# Patient Record
Sex: FEMALE | Race: WHITE | NOT HISPANIC OR LATINO | Employment: OTHER | URBAN - METROPOLITAN AREA
[De-identification: names, ages, dates, MRNs, and addresses within clinical notes are randomized per-mention and may not be internally consistent; named-entity substitution may affect disease eponyms.]

---

## 2017-01-18 ENCOUNTER — OFFICE VISIT - HEALTHEAST (OUTPATIENT)
Dept: FAMILY MEDICINE | Facility: CLINIC | Age: 52
End: 2017-01-18

## 2017-01-18 DIAGNOSIS — Z20.818 EXPOSURE TO STREP THROAT: ICD-10-CM

## 2017-01-19 ENCOUNTER — COMMUNICATION - HEALTHEAST (OUTPATIENT)
Dept: FAMILY MEDICINE | Facility: CLINIC | Age: 52
End: 2017-01-19

## 2017-02-08 ENCOUNTER — COMMUNICATION - HEALTHEAST (OUTPATIENT)
Dept: FAMILY MEDICINE | Facility: CLINIC | Age: 52
End: 2017-02-08

## 2017-02-08 DIAGNOSIS — E03.9 UNSPECIFIED HYPOTHYROIDISM: ICD-10-CM

## 2017-04-17 ENCOUNTER — COMMUNICATION - HEALTHEAST (OUTPATIENT)
Dept: FAMILY MEDICINE | Facility: CLINIC | Age: 52
End: 2017-04-17

## 2017-04-17 DIAGNOSIS — E03.9 UNSPECIFIED HYPOTHYROIDISM: ICD-10-CM

## 2017-05-15 ENCOUNTER — OFFICE VISIT - HEALTHEAST (OUTPATIENT)
Dept: FAMILY MEDICINE | Facility: CLINIC | Age: 52
End: 2017-05-15

## 2017-05-15 DIAGNOSIS — C18.9 MALIGNANT NEOPLASM OF COLON (H): ICD-10-CM

## 2017-05-15 DIAGNOSIS — R73.09 OTHER ABNORMAL GLUCOSE: ICD-10-CM

## 2017-05-15 DIAGNOSIS — E03.9 HYPOTHYROIDISM: ICD-10-CM

## 2017-05-15 DIAGNOSIS — E03.9 UNSPECIFIED HYPOTHYROIDISM: ICD-10-CM

## 2017-05-15 LAB
CEA SERPL-MCNC: 0.7 NG/ML (ref 0–3)
CHOLEST SERPL-MCNC: 241 MG/DL
FASTING STATUS PATIENT QL REPORTED: YES
HBA1C MFR BLD: 5.7 % (ref 3.5–6)
HDLC SERPL-MCNC: 48 MG/DL
LDLC SERPL CALC-MCNC: 174 MG/DL
TRIGL SERPL-MCNC: 95 MG/DL

## 2017-05-15 ASSESSMENT — MIFFLIN-ST. JEOR: SCORE: 1828.74

## 2017-05-16 ENCOUNTER — COMMUNICATION - HEALTHEAST (OUTPATIENT)
Dept: FAMILY MEDICINE | Facility: CLINIC | Age: 52
End: 2017-05-16

## 2017-05-17 ENCOUNTER — AMBULATORY - HEALTHEAST (OUTPATIENT)
Dept: FAMILY MEDICINE | Facility: CLINIC | Age: 52
End: 2017-05-17

## 2017-07-23 ENCOUNTER — COMMUNICATION - HEALTHEAST (OUTPATIENT)
Dept: FAMILY MEDICINE | Facility: CLINIC | Age: 52
End: 2017-07-23

## 2017-07-25 ENCOUNTER — AMBULATORY - HEALTHEAST (OUTPATIENT)
Dept: FAMILY MEDICINE | Facility: CLINIC | Age: 52
End: 2017-07-25

## 2017-07-25 DIAGNOSIS — E03.9 HYPOTHYROID: ICD-10-CM

## 2017-08-25 ENCOUNTER — HOSPITAL ENCOUNTER (OUTPATIENT)
Dept: MAMMOGRAPHY | Facility: CLINIC | Age: 52
Discharge: HOME | End: 2017-08-25
Attending: FAMILY MEDICINE

## 2017-08-25 DIAGNOSIS — Z12.31 VISIT FOR SCREENING MAMMOGRAM: ICD-10-CM

## 2017-09-22 ENCOUNTER — COMMUNICATION - HEALTHEAST (OUTPATIENT)
Dept: FAMILY MEDICINE | Facility: CLINIC | Age: 52
End: 2017-09-22

## 2017-09-22 ENCOUNTER — OFFICE VISIT - HEALTHEAST (OUTPATIENT)
Dept: FAMILY MEDICINE | Facility: CLINIC | Age: 52
End: 2017-09-22

## 2017-09-22 ENCOUNTER — COMMUNICATION - HEALTHEAST (OUTPATIENT)
Dept: SCHEDULING | Facility: CLINIC | Age: 52
End: 2017-09-22

## 2017-09-22 DIAGNOSIS — T63.441A BEE STING: ICD-10-CM

## 2017-12-20 ENCOUNTER — COMMUNICATION - HEALTHEAST (OUTPATIENT)
Dept: FAMILY MEDICINE | Facility: CLINIC | Age: 52
End: 2017-12-20

## 2017-12-20 DIAGNOSIS — E03.9 HYPOTHYROIDISM: ICD-10-CM

## 2018-01-15 ENCOUNTER — OFFICE VISIT - HEALTHEAST (OUTPATIENT)
Dept: FAMILY MEDICINE | Facility: CLINIC | Age: 53
End: 2018-01-15

## 2018-01-15 DIAGNOSIS — E78.5 HYPERLIPIDEMIA: ICD-10-CM

## 2018-01-15 DIAGNOSIS — Z23 NEED FOR INFLUENZA VACCINATION: ICD-10-CM

## 2018-01-15 DIAGNOSIS — C18.9 MALIGNANT NEOPLASM OF COLON (H): ICD-10-CM

## 2018-01-15 DIAGNOSIS — E03.9 HYPOTHYROIDISM: ICD-10-CM

## 2018-01-15 DIAGNOSIS — E55.9 VITAMIN D DEFICIENCY: ICD-10-CM

## 2018-01-15 DIAGNOSIS — Z00.00 ROUTINE GENERAL MEDICAL EXAMINATION AT A HEALTH CARE FACILITY: ICD-10-CM

## 2018-01-15 DIAGNOSIS — Z23 NEED FOR HEPATITIS A IMMUNIZATION: ICD-10-CM

## 2018-01-15 DIAGNOSIS — R73.09 OTHER ABNORMAL GLUCOSE: ICD-10-CM

## 2018-01-15 LAB
ALBUMIN SERPL-MCNC: 4.1 G/DL (ref 3.5–5)
ALP SERPL-CCNC: 68 U/L (ref 45–120)
ALT SERPL W P-5'-P-CCNC: 33 U/L (ref 0–45)
ANION GAP SERPL CALCULATED.3IONS-SCNC: 9 MMOL/L (ref 5–18)
AST SERPL W P-5'-P-CCNC: 23 U/L (ref 0–40)
BILIRUB SERPL-MCNC: 0.8 MG/DL (ref 0–1)
BUN SERPL-MCNC: 14 MG/DL (ref 8–22)
CALCIUM SERPL-MCNC: 10 MG/DL (ref 8.5–10.5)
CEA SERPL-MCNC: 1 NG/ML (ref 0–3)
CHLORIDE BLD-SCNC: 104 MMOL/L (ref 98–107)
CHOLEST SERPL-MCNC: 284 MG/DL
CO2 SERPL-SCNC: 29 MMOL/L (ref 22–31)
CREAT SERPL-MCNC: 0.78 MG/DL (ref 0.6–1.1)
ERYTHROCYTE [DISTWIDTH] IN BLOOD BY AUTOMATED COUNT: 12.2 % (ref 11–14.5)
FASTING STATUS PATIENT QL REPORTED: ABNORMAL
GFR SERPL CREATININE-BSD FRML MDRD: >60 ML/MIN/1.73M2
GLUCOSE BLD-MCNC: 111 MG/DL (ref 70–125)
HBA1C MFR BLD: 5.7 % (ref 3.5–6)
HCT VFR BLD AUTO: 43.8 % (ref 35–47)
HDLC SERPL-MCNC: 61 MG/DL
HGB BLD-MCNC: 14.8 G/DL (ref 12–16)
LDLC SERPL CALC-MCNC: 202 MG/DL
MCH RBC QN AUTO: 30.2 PG (ref 27–34)
MCHC RBC AUTO-ENTMCNC: 33.8 G/DL (ref 32–36)
MCV RBC AUTO: 89 FL (ref 80–100)
PLATELET # BLD AUTO: 229 THOU/UL (ref 140–440)
PMV BLD AUTO: 8.5 FL (ref 7–10)
POTASSIUM BLD-SCNC: 5 MMOL/L (ref 3.5–5)
PROT SERPL-MCNC: 7.6 G/DL (ref 6–8)
RBC # BLD AUTO: 4.89 MILL/UL (ref 3.8–5.4)
SODIUM SERPL-SCNC: 142 MMOL/L (ref 136–145)
TRIGL SERPL-MCNC: 105 MG/DL
TSH SERPL DL<=0.005 MIU/L-ACNC: 1.3 UIU/ML (ref 0.3–5)
WBC: 7.2 THOU/UL (ref 4–11)

## 2018-01-15 RX ORDER — MAGNESIUM 30 MG
30 TABLET ORAL 2 TIMES DAILY
Status: SHIPPED | COMMUNITY
Start: 2018-01-15 | End: 2023-01-09

## 2018-01-15 ASSESSMENT — MIFFLIN-ST. JEOR: SCORE: 1810.59

## 2018-01-16 ENCOUNTER — COMMUNICATION - HEALTHEAST (OUTPATIENT)
Dept: FAMILY MEDICINE | Facility: CLINIC | Age: 53
End: 2018-01-16

## 2018-01-16 LAB
25(OH)D3 SERPL-MCNC: 54.6 NG/ML (ref 30–80)
25(OH)D3 SERPL-MCNC: 54.6 NG/ML (ref 30–80)

## 2018-01-22 ENCOUNTER — AMBULATORY - HEALTHEAST (OUTPATIENT)
Dept: FAMILY MEDICINE | Facility: CLINIC | Age: 53
End: 2018-01-22

## 2018-02-03 ENCOUNTER — COMMUNICATION - HEALTHEAST (OUTPATIENT)
Dept: FAMILY MEDICINE | Facility: CLINIC | Age: 53
End: 2018-02-03

## 2018-02-25 ENCOUNTER — COMMUNICATION - HEALTHEAST (OUTPATIENT)
Dept: FAMILY MEDICINE | Facility: CLINIC | Age: 53
End: 2018-02-25

## 2018-03-07 ENCOUNTER — RECORDS - HEALTHEAST (OUTPATIENT)
Dept: ADMINISTRATIVE | Facility: OTHER | Age: 53
End: 2018-03-07

## 2018-06-24 ENCOUNTER — COMMUNICATION - HEALTHEAST (OUTPATIENT)
Dept: FAMILY MEDICINE | Facility: CLINIC | Age: 53
End: 2018-06-24

## 2018-06-24 DIAGNOSIS — E03.9 HYPOTHYROIDISM: ICD-10-CM

## 2018-07-15 ENCOUNTER — COMMUNICATION - HEALTHEAST (OUTPATIENT)
Dept: FAMILY MEDICINE | Facility: CLINIC | Age: 53
End: 2018-07-15

## 2018-07-16 ENCOUNTER — AMBULATORY - HEALTHEAST (OUTPATIENT)
Dept: FAMILY MEDICINE | Facility: CLINIC | Age: 53
End: 2018-07-16

## 2018-07-18 ENCOUNTER — OFFICE VISIT - HEALTHEAST (OUTPATIENT)
Dept: FAMILY MEDICINE | Facility: CLINIC | Age: 53
End: 2018-07-18
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
## 2018-07-18 ASSESSMENT — MIFFLIN-ST. JEOR: SCORE: 1801.52

## 2018-12-09 ENCOUNTER — COMMUNICATION - HEALTHEAST (OUTPATIENT)
Dept: FAMILY MEDICINE | Facility: CLINIC | Age: 53
End: 2018-12-09

## 2018-12-09 DIAGNOSIS — E03.9 HYPOTHYROIDISM: ICD-10-CM

## 2019-02-26 ENCOUNTER — COMMUNICATION - HEALTHEAST (OUTPATIENT)
Dept: FAMILY MEDICINE | Facility: CLINIC | Age: 54
End: 2019-02-26

## 2019-02-26 DIAGNOSIS — E03.9 HYPOTHYROIDISM: ICD-10-CM

## 2019-04-23 ENCOUNTER — HOSPITAL ENCOUNTER (OUTPATIENT)
Dept: MAMMOGRAPHY | Facility: CLINIC | Age: 54
Discharge: HOME | End: 2019-04-23

## 2019-04-23 DIAGNOSIS — Z12.31 VISIT FOR SCREENING MAMMOGRAM: ICD-10-CM

## 2019-05-16 ENCOUNTER — COMMUNICATION - HEALTHEAST (OUTPATIENT)
Dept: FAMILY MEDICINE | Facility: CLINIC | Age: 54
End: 2019-05-16

## 2019-05-16 DIAGNOSIS — E03.9 HYPOTHYROIDISM: ICD-10-CM

## 2019-05-28 ENCOUNTER — COMMUNICATION - HEALTHEAST (OUTPATIENT)
Dept: LAB | Facility: CLINIC | Age: 54
End: 2019-05-28

## 2019-05-28 DIAGNOSIS — R73.09 OTHER ABNORMAL GLUCOSE: ICD-10-CM

## 2019-05-28 DIAGNOSIS — E78.5 HYPERLIPIDEMIA, UNSPECIFIED HYPERLIPIDEMIA TYPE: ICD-10-CM

## 2019-05-28 DIAGNOSIS — C18.9 MALIGNANT NEOPLASM OF COLON, UNSPECIFIED PART OF COLON (H): ICD-10-CM

## 2019-05-28 DIAGNOSIS — E03.9 HYPOTHYROIDISM, UNSPECIFIED TYPE: ICD-10-CM

## 2019-05-29 ENCOUNTER — AMBULATORY - HEALTHEAST (OUTPATIENT)
Dept: LAB | Facility: CLINIC | Age: 54
End: 2019-05-29

## 2019-05-29 DIAGNOSIS — E03.9 HYPOTHYROIDISM, UNSPECIFIED TYPE: ICD-10-CM

## 2019-05-29 DIAGNOSIS — C18.9 MALIGNANT NEOPLASM OF COLON, UNSPECIFIED PART OF COLON (H): ICD-10-CM

## 2019-05-29 DIAGNOSIS — E78.5 HYPERLIPIDEMIA, UNSPECIFIED HYPERLIPIDEMIA TYPE: ICD-10-CM

## 2019-05-29 LAB
ALBUMIN SERPL-MCNC: 3.9 G/DL (ref 3.5–5)
ALP SERPL-CCNC: 62 U/L (ref 45–120)
ALT SERPL W P-5'-P-CCNC: 23 U/L (ref 0–45)
ANION GAP SERPL CALCULATED.3IONS-SCNC: 9 MMOL/L (ref 5–18)
AST SERPL W P-5'-P-CCNC: 19 U/L (ref 0–40)
BILIRUB SERPL-MCNC: 0.6 MG/DL (ref 0–1)
BUN SERPL-MCNC: 14 MG/DL (ref 8–22)
CALCIUM SERPL-MCNC: 9.9 MG/DL (ref 8.5–10.5)
CEA SERPL-MCNC: 0.7 NG/ML (ref 0–3)
CHLORIDE BLD-SCNC: 106 MMOL/L (ref 98–107)
CHOLEST SERPL-MCNC: 246 MG/DL
CO2 SERPL-SCNC: 28 MMOL/L (ref 22–31)
CREAT SERPL-MCNC: 0.83 MG/DL (ref 0.6–1.1)
FASTING STATUS PATIENT QL REPORTED: YES
GFR SERPL CREATININE-BSD FRML MDRD: >60 ML/MIN/1.73M2
GLUCOSE BLD-MCNC: 101 MG/DL (ref 70–125)
HDLC SERPL-MCNC: 52 MG/DL
LDLC SERPL CALC-MCNC: 170 MG/DL
POTASSIUM BLD-SCNC: 4.3 MMOL/L (ref 3.5–5)
PROT SERPL-MCNC: 6.8 G/DL (ref 6–8)
SODIUM SERPL-SCNC: 143 MMOL/L (ref 136–145)
T4 FREE SERPL-MCNC: 1.1 NG/DL (ref 0.7–1.8)
TRIGL SERPL-MCNC: 118 MG/DL
TSH SERPL DL<=0.005 MIU/L-ACNC: 0.32 UIU/ML (ref 0.3–5)

## 2019-05-30 ENCOUNTER — COMMUNICATION - HEALTHEAST (OUTPATIENT)
Dept: FAMILY MEDICINE | Facility: CLINIC | Age: 54
End: 2019-05-30

## 2019-06-14 ENCOUNTER — COMMUNICATION - HEALTHEAST (OUTPATIENT)
Dept: FAMILY MEDICINE | Facility: CLINIC | Age: 54
End: 2019-06-14

## 2019-06-14 DIAGNOSIS — E03.9 HYPOTHYROIDISM: ICD-10-CM

## 2019-07-08 ENCOUNTER — OFFICE VISIT - HEALTHEAST (OUTPATIENT)
Dept: FAMILY MEDICINE | Facility: CLINIC | Age: 54
End: 2019-07-08

## 2019-07-08 DIAGNOSIS — Z00.00 ROUTINE ADULT HEALTH MAINTENANCE: ICD-10-CM

## 2019-07-08 DIAGNOSIS — E03.9 HYPOTHYROIDISM: ICD-10-CM

## 2019-07-08 DIAGNOSIS — E78.5 HYPERLIPIDEMIA, UNSPECIFIED HYPERLIPIDEMIA TYPE: ICD-10-CM

## 2019-07-08 DIAGNOSIS — E66.01 MORBID OBESITY (H): ICD-10-CM

## 2019-07-08 DIAGNOSIS — C18.7 MALIGNANT NEOPLASM OF SIGMOID COLON (H): ICD-10-CM

## 2019-10-20 ENCOUNTER — COMMUNICATION - HEALTHEAST (OUTPATIENT)
Dept: FAMILY MEDICINE | Facility: CLINIC | Age: 54
End: 2019-10-20

## 2019-10-21 ENCOUNTER — AMBULATORY - HEALTHEAST (OUTPATIENT)
Dept: FAMILY MEDICINE | Facility: CLINIC | Age: 54
End: 2019-10-21
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
## 2019-11-19 ENCOUNTER — AMBULATORY - HEALTHEAST (OUTPATIENT)
Dept: NURSING | Facility: CLINIC | Age: 54
End: 2019-11-19

## 2020-02-05 ENCOUNTER — COMMUNICATION - HEALTHEAST (OUTPATIENT)
Dept: FAMILY MEDICINE | Facility: CLINIC | Age: 55
End: 2020-02-05

## 2020-02-05 DIAGNOSIS — E03.9 HYPOTHYROIDISM: ICD-10-CM

## 2020-02-19 ENCOUNTER — OFFICE VISIT - HEALTHEAST (OUTPATIENT)
Dept: FAMILY MEDICINE | Facility: CLINIC | Age: 55
End: 2020-02-19

## 2020-02-19 DIAGNOSIS — R30.0 DYSURIA: ICD-10-CM

## 2020-02-19 LAB
ALBUMIN UR-MCNC: ABNORMAL MG/DL
APPEARANCE UR: ABNORMAL
BILIRUB UR QL STRIP: NEGATIVE
COLOR UR AUTO: YELLOW
GLUCOSE UR STRIP-MCNC: NEGATIVE MG/DL
HGB UR QL STRIP: ABNORMAL
KETONES UR STRIP-MCNC: NEGATIVE MG/DL
LEUKOCYTE ESTERASE UR QL STRIP: ABNORMAL
NITRATE UR QL: NEGATIVE
PH UR STRIP: 7.5 [PH] (ref 5–8)
SP GR UR STRIP: 1.02 (ref 1–1.03)
UROBILINOGEN UR STRIP-ACNC: ABNORMAL

## 2020-02-21 LAB — BACTERIA SPEC CULT: ABNORMAL

## 2020-04-17 ENCOUNTER — COMMUNICATION - HEALTHEAST (OUTPATIENT)
Dept: ADMINISTRATIVE | Facility: CLINIC | Age: 55
End: 2020-04-17

## 2020-04-17 DIAGNOSIS — E03.9 HYPOTHYROIDISM: ICD-10-CM

## 2020-07-19 ENCOUNTER — COMMUNICATION - HEALTHEAST (OUTPATIENT)
Dept: FAMILY MEDICINE | Facility: CLINIC | Age: 55
End: 2020-07-19

## 2020-07-19 DIAGNOSIS — E03.9 HYPOTHYROIDISM: ICD-10-CM

## 2020-07-30 ENCOUNTER — COMMUNICATION - HEALTHEAST (OUTPATIENT)
Dept: FAMILY MEDICINE | Facility: CLINIC | Age: 55
End: 2020-07-30

## 2020-08-13 ENCOUNTER — OFFICE VISIT - HEALTHEAST (OUTPATIENT)
Dept: FAMILY MEDICINE | Facility: CLINIC | Age: 55
End: 2020-08-13

## 2020-08-13 DIAGNOSIS — C18.9 MALIGNANT NEOPLASM OF COLON, UNSPECIFIED PART OF COLON (H): ICD-10-CM

## 2020-08-13 DIAGNOSIS — R05.9 COUGH: ICD-10-CM

## 2020-08-13 DIAGNOSIS — E03.9 HYPOTHYROIDISM, UNSPECIFIED TYPE: ICD-10-CM

## 2020-08-13 DIAGNOSIS — E78.2 MIXED HYPERLIPIDEMIA: ICD-10-CM

## 2020-08-13 DIAGNOSIS — R73.09 OTHER ABNORMAL GLUCOSE: ICD-10-CM

## 2020-08-13 LAB — HBA1C MFR BLD: 5.4 %

## 2020-08-14 LAB
ALBUMIN SERPL-MCNC: 4.5 G/DL (ref 3.5–5)
ALP SERPL-CCNC: 68 U/L (ref 45–120)
ALT SERPL W P-5'-P-CCNC: 25 U/L (ref 0–45)
ANION GAP SERPL CALCULATED.3IONS-SCNC: 12 MMOL/L (ref 5–18)
AST SERPL W P-5'-P-CCNC: 20 U/L (ref 0–40)
BILIRUB SERPL-MCNC: 0.5 MG/DL (ref 0–1)
BUN SERPL-MCNC: 12 MG/DL (ref 8–22)
CALCIUM SERPL-MCNC: 10.3 MG/DL (ref 8.5–10.5)
CHLORIDE BLD-SCNC: 103 MMOL/L (ref 98–107)
CHOLEST SERPL-MCNC: 283 MG/DL
CO2 SERPL-SCNC: 27 MMOL/L (ref 22–31)
CREAT SERPL-MCNC: 0.78 MG/DL (ref 0.6–1.1)
FASTING STATUS PATIENT QL REPORTED: NO
GFR SERPL CREATININE-BSD FRML MDRD: >60 ML/MIN/1.73M2
GLUCOSE BLD-MCNC: 96 MG/DL (ref 70–125)
HDLC SERPL-MCNC: 62 MG/DL
LDLC SERPL CALC-MCNC: 178 MG/DL
POTASSIUM BLD-SCNC: 4.2 MMOL/L (ref 3.5–5)
PROT SERPL-MCNC: 7.5 G/DL (ref 6–8)
SODIUM SERPL-SCNC: 142 MMOL/L (ref 136–145)
T4 FREE SERPL-MCNC: 1 NG/DL (ref 0.7–1.8)
TRIGL SERPL-MCNC: 214 MG/DL
TSH SERPL DL<=0.005 MIU/L-ACNC: 0.91 UIU/ML (ref 0.3–5)

## 2020-08-17 LAB
CEA SERPL-MCNC: 0.9 NG/ML (ref 0–3)
COVID-19 ANTIBODY IGG: NEGATIVE

## 2020-08-18 ENCOUNTER — COMMUNICATION - HEALTHEAST (OUTPATIENT)
Dept: SCHEDULING | Facility: CLINIC | Age: 55
End: 2020-08-18

## 2020-08-18 DIAGNOSIS — E03.9 HYPOTHYROIDISM: ICD-10-CM

## 2021-03-11 ENCOUNTER — COMMUNICATION - HEALTHEAST (OUTPATIENT)
Dept: FAMILY MEDICINE | Facility: CLINIC | Age: 56
End: 2021-03-11

## 2021-03-25 ENCOUNTER — AMBULATORY - HEALTHEAST (OUTPATIENT)
Dept: NURSING | Facility: CLINIC | Age: 56
End: 2021-03-25

## 2021-03-31 ENCOUNTER — OFFICE VISIT - HEALTHEAST (OUTPATIENT)
Dept: FAMILY MEDICINE | Facility: CLINIC | Age: 56
End: 2021-03-31

## 2021-03-31 DIAGNOSIS — R30.0 DYSURIA: ICD-10-CM

## 2021-03-31 LAB
ALBUMIN UR-MCNC: NEGATIVE G/DL
APPEARANCE UR: ABNORMAL
BILIRUB UR QL STRIP: NEGATIVE
COLOR UR AUTO: YELLOW
GLUCOSE UR STRIP-MCNC: NEGATIVE MG/DL
HGB UR QL STRIP: ABNORMAL
KETONES UR STRIP-MCNC: NEGATIVE MG/DL
LEUKOCYTE ESTERASE UR QL STRIP: ABNORMAL
NITRATE UR QL: NEGATIVE
PH UR STRIP: 7 [PH] (ref 5–8)
SP GR UR STRIP: 1.01 (ref 1–1.03)
UROBILINOGEN UR STRIP-ACNC: ABNORMAL

## 2021-03-31 ASSESSMENT — MIFFLIN-ST. JEOR: SCORE: 1715.34

## 2021-04-02 LAB — BACTERIA SPEC CULT: ABNORMAL

## 2021-04-15 ENCOUNTER — AMBULATORY - HEALTHEAST (OUTPATIENT)
Dept: NURSING | Facility: CLINIC | Age: 56
End: 2021-04-15

## 2021-05-26 ENCOUNTER — RECORDS - HEALTHEAST (OUTPATIENT)
Dept: ADMINISTRATIVE | Facility: CLINIC | Age: 56
End: 2021-05-26

## 2021-05-28 NOTE — TELEPHONE ENCOUNTER
RN cannot approve Refill Request    RN can NOT refill this medication overdue for office visits and/or labs.    Sb New, [facility] Triage/Med Refill 5/16/2019    Requested Prescriptions   Pending Prescriptions Disp Refills     levothyroxine (SYNTHROID, LEVOTHROID) 137 MCG tablet [Pharmacy Med Name: LEVOTHYROXINE 0.137MG (137MCG) TAB] 90 tablet 0     Sig: TAKE 1 TABLET(137 MCG) BY MOUTH DAILY       Thyroid Hormones Protocol Failed - 5/16/2019  9:33 AM        Failed - TSH on file in past 12 months for patient age 12 & older     TSH   Date Value Ref Range Status   01/15/2018 1.30 0.30 - 5.00 uIU/mL Final                   Passed - Provider visit in past 12 months or next 3 months     Last office visit with prescriber/PCP: Visit date not found OR same dept: 7/18/2018 Sulema Kaminski MD OR same specialty: 7/18/2018 Sulema Kaminski MD  Last physical: Visit date not found Last MTM visit: Visit date not found   Next visit within 3 mo: Visit date not found  Next physical within 3 mo: Visit date not found  Prescriber OR PCP: Mike Goodrich CNP  Last diagnosis associated with med order: 1. Hypothyroidism  - levothyroxine (SYNTHROID, LEVOTHROID) 137 MCG tablet [Pharmacy Med Name: LEVOTHYROXINE 0.137MG (137MCG) TAB]; TAKE 1 TABLET(137 MCG) BY MOUTH DAILY  Dispense: 90 tablet; Refill: 0    If protocol passes may refill for 12 months if within 3 months of last provider visit (or a total of 15 months).

## 2021-05-29 NOTE — TELEPHONE ENCOUNTER
Refill Approved    Rx renewed per Medication Renewal Policy. Medication was last renewed on 5/21/2019         Lazaro Zaragoza, [facility] Triage/Med Refill 6/14/2019     Requested Prescriptions   Pending Prescriptions Disp Refills     levothyroxine (SYNTHROID, LEVOTHROID) 137 MCG tablet [Pharmacy Med Name: LEVOTHYROXINE 0.137MG (137MCG) TAB] 30 tablet 0     Sig: TAKE 1 TABLET BY MOUTH DAILY       Thyroid Hormones Protocol Passed - 6/14/2019  9:35 AM        Passed - Provider visit in past 12 months or next 3 months     Last office visit with prescriber/PCP: Visit date not found OR same dept: 7/18/2018 Sulema Kaminski MD OR same specialty: 7/18/2018 Sulema Kaminski MD  Last physical: Visit date not found Last MTM visit: Visit date not found   Next visit within 3 mo: Visit date not found  Next physical within 3 mo: Visit date not found  Prescriber OR PCP: Mike Goodrich CNP  Last diagnosis associated with med order: 1. Hypothyroidism  - levothyroxine (SYNTHROID, LEVOTHROID) 137 MCG tablet [Pharmacy Med Name: LEVOTHYROXINE 0.137MG (137MCG) TAB]; TAKE 1 TABLET BY MOUTH DAILY  Dispense: 30 tablet; Refill: 0    If protocol passes may refill for 12 months if within 3 months of last provider visit (or a total of 15 months).             Passed - TSH on file in past 12 months for patient age 12 & older     TSH   Date Value Ref Range Status   05/29/2019 0.32 0.30 - 5.00 uIU/mL Final

## 2021-05-29 NOTE — TELEPHONE ENCOUNTER
Gordy ptebenezer on lab schedule 5/29/19 at 8:30am for fasting labs for upcoming physical on 6/6/19 with Dr. Laureano

## 2021-05-30 VITALS — WEIGHT: 274 LBS | BODY MASS INDEX: 45.65 KG/M2 | HEIGHT: 65 IN

## 2021-05-30 VITALS — WEIGHT: 270 LBS | BODY MASS INDEX: 44.93 KG/M2

## 2021-05-30 NOTE — PROGRESS NOTES
Assessment & Plan:        1. Routine adult health maintenance    2. Hypothyroidism     She will continue her same dosing and follow up with any concerns.  - levothyroxine (SYNTHROID, LEVOTHROID) 137 MCG tablet; Take 1 tablet (137 mcg total) by mouth daily.  Dispense: 90 tablet; Refill: 3    3. Hyperlipidemia, unspecified hyperlipidemia type      We reviewed dietary recommendations, including low salt and high fiber diet, and  recommendations for regular exercise/activity.  We discussed limiting saturated and trans fats in her diet and using more of the good fats such as olive oil, canola oil, flax seed and peanut oil, etc. She prefers to continue to avoid statin medications. We reviewed risk calculators and is 2% by JESS, 7% by [place].      4. Malignant neoplasm of sigmoid colon (H)     She is due for repeat colonoscopy in 2 yrs for follow up and is aware of indications for re-evaluation sooner.    5. Morbid obesity (H)         Patient Counseling:    --Nutrition: Stressed importance of moderation in sodium/caffeine intake, saturated fat and cholesterol, caloric balance, sufficient intake of fresh fruits, vegetables, calcium, and iron.    --Discussed the importance of vitamin D and calcium supplements/intake, and the risks and benefits of daily use of baby aspirin.   --Discussed symptomatic management of hot flushes, night sweats, HRT, etc   --Exercise: Stressed the importance of regular weight-bearing exercise    --Substance Abuse: limit alcohol use    --Injury prevention: Discussed safety belts, distracted driving, fire safety    --Dental health: Discussed importance of regular tooth brushing, flossing, and dental visits.    --Discussed risks/benefits of screening colonoscopy, mammography and the recommended intervals.    --Discussed DEXA screening.   --Discussed risks and benefits of regular breast self exams and evaluation with any changes    --Discussed pap frequency and indications for stopping  screening.   --Immunizations reviewed.    --Advance Directives were reviewed and she has one on file which she will review and update.    Discussed the patient's BMI with her.  The BMI is above average; BMI management plan is completed     The following high BMI interventions were performed this visit: encouragement to exercise and lifestyle education regarding diet    Subjective:         Charo Leung is a 54 y.o. female who presents for annual exam.   The patient reports no concerns.       Fasting today? Yes       Has the patient ever been transfused or tattooed?: no.      Do you have pain that bothers you in your daily life? no       The patient reports that there is not domestic violence in her life.     Gynecologic History     LMP: No LMP recorded. Patient has had a hysterectomy in 2012 due to fibroids..  Menopause at ? years.   The patient is sexually active.  Last Pap: . Results were: normal  Abnormal pap history? Yes, history dysplasia  Last mammogram: 2019. Results were: normal  : 2  Para: 2002    Healthy Habits:   Regular Exercise: Yes  Sunscreen Use: Yes  Healthy Diet: Yes  Dental Visits Regularly: No  Seat Belt: Yes  Sexually active: No  Self Breast Exam Monthly:Yes  Colonoscopy: Yes  Lipid Profile: Yes  Glucose Screen: Yes  Prevention of Osteoporosis: Yes  Last Dexa: No  Guns at Home:  N/A      Immunization History   Administered Date(s) Administered     Hep A, Adult IM (19yr & older) 2010, 2013, 01/15/2018     IPV 2010     Influenza, inj, historic,unspecified 2010     Influenza, seasonal,quad inj 36+ mos 01/15/2018     Influenza, seasonal,quad inj 6-35 mos 2011, 10/19/2012, 2014     Influenza,seasonal quad, PF 2013     Influenza,seasonal quad, PF, 36+MOS 2015     Td,adult,historic,unspecified 2010     Tdap 2010     Typhoid, historic, Unspecified 2010     Immunization status: up to date and documented.    The  following portions of the patient's history were reviewed and updated as appropriate: allergies, current medications, past family history, past medical history, past social history, past surgical history and problem list.    Review of Systems  A 12 point comprehensive review of systems was negative except as noted.    Mom passed away in Oct - had been caring for her for several mos  Father dx esophageal ca  Watching diet - trying to avoid cholesterol med  Some sleep issues on/off - wakes up in night  Magnesium helps some, occ takes melatonin  Energy ok. Mood ok. Anxiety symptoms on/off - worse when thyroid overactive     Objective:        Vitals:    07/08/19 0845   BP: 112/80   Patient Position: Sitting   Cuff Size: Adult Large   Pulse: 71   SpO2: 96%   Weight: (!) 257 lb 1 oz (116.6 kg)      Body mass index is 42.78 kg/m .  General: alert, pleasant, and no distress  Head: Normocephalic, without obvious abnormality, atraumatic  Eyes: conjunctivae and sclerae normal and pupils equal, round, reactive to   light and accomodation  Ears: normal TM's and external ear canals both ears  Nose: no discharge, no sinus tenderness  Throat: lips, mucosa, and tongue normal; teeth and gums normal  Neck: no adenopathy, supple, symmetrical, trachea midline and thyroid normal  Back: symmetric, ROM normal. No CVA tenderness.  Lungs: clear to auscultation bilaterally  Breasts: normal appearance, no masses or tenderness  Heart : regular rate and rhythm and no murmurs  Abdomen:  bowel sounds normal, no masses palpable and soft, non-tender  Pelvic: exam deferred   Extremities: extremities normal, atraumatic, no cyanosis or edema  Pulses: 2+ and symmetric  Skin: Skin color, texture, turgor normal. No rashes or lesions  Lymph nodes: Cervical, supraclavicular, and axillary nodes normal.  Neurologic: Grossly normal      Results for orders placed or performed in visit on 05/29/19   Thyroid Stimulating Hormone (TSH)   Result Value Ref Range     TSH 0.32 0.30 - 5.00 uIU/mL   T4, Free   Result Value Ref Range    Free T4 1.1 0.7 - 1.8 ng/dL   Comprehensive Metabolic Panel   Result Value Ref Range    Sodium 143 136 - 145 mmol/L    Potassium 4.3 3.5 - 5.0 mmol/L    Chloride 106 98 - 107 mmol/L    CO2 28 22 - 31 mmol/L    Anion Gap, Calculation 9 5 - 18 mmol/L    Glucose 101 70 - 125 mg/dL    BUN 14 8 - 22 mg/dL    Creatinine 0.83 0.60 - 1.10 mg/dL    GFR MDRD Af Amer >60 >60 mL/min/1.73m2    GFR MDRD Non Af Amer >60 >60 mL/min/1.73m2    Bilirubin, Total 0.6 0.0 - 1.0 mg/dL    Calcium 9.9 8.5 - 10.5 mg/dL    Protein, Total 6.8 6.0 - 8.0 g/dL    Albumin 3.9 3.5 - 5.0 g/dL    Alkaline Phosphatase 62 45 - 120 U/L    AST 19 0 - 40 U/L    ALT 23 0 - 45 U/L   Lipid Cascade   Result Value Ref Range    Cholesterol 246 (H) <=199 mg/dL    Triglycerides 118 <=149 mg/dL    HDL Cholesterol 52 >=50 mg/dL    LDL Calculated 170 (H) <=129 mg/dL    Patient Fasting > 8hrs? Yes    CEA (Carcinoembryonic Antigen)   Result Value Ref Range    CEA 0.7 0.0 - 3.0 ng/mL

## 2021-05-31 ENCOUNTER — RECORDS - HEALTHEAST (OUTPATIENT)
Dept: ADMINISTRATIVE | Facility: CLINIC | Age: 56
End: 2021-05-31

## 2021-05-31 VITALS — HEIGHT: 65 IN | WEIGHT: 270 LBS | BODY MASS INDEX: 44.98 KG/M2

## 2021-05-31 VITALS — WEIGHT: 269 LBS | BODY MASS INDEX: 44.76 KG/M2

## 2021-06-01 VITALS — WEIGHT: 265 LBS | BODY MASS INDEX: 44.93 KG/M2 | HEIGHT: 65 IN | BODY MASS INDEX: 44.1 KG/M2

## 2021-06-01 VITALS — WEIGHT: 268 LBS | HEIGHT: 65 IN | BODY MASS INDEX: 44.65 KG/M2

## 2021-06-03 VITALS — WEIGHT: 257.06 LBS | BODY MASS INDEX: 42.78 KG/M2

## 2021-06-04 VITALS
BODY MASS INDEX: 39.97 KG/M2 | OXYGEN SATURATION: 97 % | DIASTOLIC BLOOD PRESSURE: 82 MMHG | HEART RATE: 74 BPM | WEIGHT: 240.19 LBS | SYSTOLIC BLOOD PRESSURE: 130 MMHG

## 2021-06-04 VITALS
SYSTOLIC BLOOD PRESSURE: 104 MMHG | WEIGHT: 240.31 LBS | RESPIRATION RATE: 16 BRPM | OXYGEN SATURATION: 97 % | BODY MASS INDEX: 39.99 KG/M2 | DIASTOLIC BLOOD PRESSURE: 68 MMHG | HEART RATE: 81 BPM

## 2021-06-05 VITALS
OXYGEN SATURATION: 98 % | DIASTOLIC BLOOD PRESSURE: 80 MMHG | BODY MASS INDEX: 41.48 KG/M2 | WEIGHT: 249 LBS | SYSTOLIC BLOOD PRESSURE: 130 MMHG | TEMPERATURE: 98.1 F | HEIGHT: 65 IN | HEART RATE: 63 BPM

## 2021-06-05 NOTE — TELEPHONE ENCOUNTER
Refill Approved    Rx renewed per Medication Renewal Policy. Medication was last renewed on 7/8/19.    Joyce Ge, [facility] Triage/Med Refill 2/5/2020     Requested Prescriptions   Pending Prescriptions Disp Refills     levothyroxine (SYNTHROID, LEVOTHROID) 137 MCG tablet [Pharmacy Med Name: LEVOTHYROXINE 0.137MG (137MCG) TAB] 90 tablet 3     Sig: TAKE 1 TABLET BY MOUTH DAILY       Thyroid Hormones Protocol Passed - 2/5/2020  9:38 AM        Passed - Provider visit in past 12 months or next 3 months     Last office visit with prescriber/PCP: Visit date not found OR same dept: Visit date not found OR same specialty: 7/18/2018 Sulema Kaminski MD  Last physical: Visit date not found Last MTM visit: Visit date not found   Next visit within 3 mo: Visit date not found  Next physical within 3 mo: Visit date not found  Prescriber OR PCP: Mike Goodrich CNP  Last diagnosis associated with med order: 1. Hypothyroidism  - levothyroxine (SYNTHROID, LEVOTHROID) 137 MCG tablet [Pharmacy Med Name: LEVOTHYROXINE 0.137MG (137MCG) TAB]; TAKE 1 TABLET BY MOUTH DAILY  Dispense: 90 tablet; Refill: 3    If protocol passes may refill for 12 months if within 3 months of last provider visit (or a total of 15 months).             Passed - TSH on file in past 12 months for patient age 12 & older     TSH   Date Value Ref Range Status   05/29/2019 0.32 0.30 - 5.00 uIU/mL Final

## 2021-06-06 NOTE — PROGRESS NOTES
Chief Complaint   Patient presents with     Dysuria     Pt c/o painful and frequent urination x one day       HPI: Dysuria, and mild abdominal discomfort for the past 2 days in duration mild intensity.  She has a past history of UTIs.  She is noted to have been wearing nylon underwear which she thinks may have exacerbated her problem.  She is somewhat complicated as she has a history of hyperglycemia and past history of colon cancer.    ROS: No hematuria.  No back pain.  No fever.    SH: Reviewed-see Snapshot for review.     FH: Reviewed-see Snapshot for review.    Meds:  Charo has a current medication list which includes the following prescription(s): docosahexaenoic acid/epa, levothyroxine, magnesium, and ciprofloxacin hcl.    O:  /68   Pulse 81   Resp 16   Wt (!) 240 lb 5 oz (109 kg)   SpO2 97%   BMI 39.99 kg/m    Constitutional:    --Vitals as above  --No acute distress  Back- no CVA tenderness  Skin-  --[place] and dry    Lab: UA positive-urine to culture    A/P:   1. Dysuria  We will send urine to culture since she has had multiple urinary tract infections.  Will treat with ciprofloxacin, increase fluids and rest.  Pyridium if not improving.  - Urinalysis Macroscopic  - Culture, Urine  - ciprofloxacin HCl (CIPRO) 500 MG tablet; Take 1 tablet (500 mg total) by mouth 2 (two) times a day for 3 days.  Dispense: 6 tablet; Refill: 0

## 2021-06-08 ENCOUNTER — COMMUNICATION - HEALTHEAST (OUTPATIENT)
Dept: FAMILY MEDICINE | Facility: CLINIC | Age: 56
End: 2021-06-08

## 2021-06-08 DIAGNOSIS — E03.9 HYPOTHYROIDISM: ICD-10-CM

## 2021-06-08 NOTE — PROGRESS NOTES
Assessment/Plan:   Throat pain following exposure to strep.  Negative RST  Mild URI, likely viral    Fluids, rest, steam, nasal saline, humidifier  Follow up if worse or no better  Strep confirmation test pending      Subjective:      Charo Leung is a 51 y.o. female who presents with ST.  She was exposed last week to strep at work.  5-6 days ago she felt some mild ST, raspy voice.  This persisted off and on and then has been much worse today. Pain with swallow - but mainly tender by the voice box.  No fever.  She has also had watery eyes for 2 days.  Today she has also had some increased runny nose and congestion.  No cough.  Poor appetite, energy okay.  No rash.  No wheeze or shortness of breath.    Allergies   Allergen Reactions     Penicillins Shortness Of Breath and Rash     Current Outpatient Prescriptions on File Prior to Visit   Medication Sig Dispense Refill     cholecalciferol, vitamin D3, 5,000 unit Tab Take 5,000 Units by mouth daily.       DOCOSAHEXANOIC ACID/EPA (FISH OIL ORAL) Take 300 mg by mouth daily.       lactobacillus comb no.10 (PROBIOTIC) 20 billion cell cap Take 1 capsule by mouth daily.       levothyroxine (SYNTHROID, LEVOTHROID) 125 MCG tablet TAKE 1 TABLET BY MOUTH EVERY DAY AT 6 AM 90 tablet 2     fluticasone (FLONASE) 50 mcg/actuation nasal spray 2 sprays into each nostril daily.       No current facility-administered medications on file prior to visit.      Patient Active Problem List   Diagnosis     Hypothyroidism     Hyperglycemia     Colon Cancer     Vitamin D Deficiency     Dyslipidemia     Obesity     Fatigue       Objective:     Visit Vitals     /68     Pulse 72     Temp 98.9  F (37.2  C) (Oral)     Resp 14     Wt (!) 270 lb (122.5 kg)     SpO2 97%     Breastfeeding No     BMI 44.93 kg/m2       Physical  General Appearance: Alert, cooperative, no distress  Head: Normocephalic, without obvious abnormality, atraumatic  Eyes: Conjunctivae are normal.   Ears: Normal TMs  and external ear canals, both ears  Nose: No significant congestion.  Throat: Throat is red posteriorly.  No exudate.  No significant lesions  Neck: No adenopathy  Lungs: Clear to auscultation bilaterally, respirations unlabored  Heart: Regular rate and rhythm  Skin: Skin color, texture, turgor normal, no rashes or lesions  Psychiatric: Patient has a normal mood and affect.        Recent Results (from the past 24 hour(s))   Rapid Strep A Screen-Throat   Result Value Ref Range    Rapid Strep A Antigen No Group A Strep detected No Group A Strep detected

## 2021-06-09 NOTE — TELEPHONE ENCOUNTER
RN cannot approve Refill Request    RN can NOT refill this medication Protocol failed and NO refill given.      Joyce Ge, [facility] Triage/Med Refill 7/20/2020    Requested Prescriptions   Pending Prescriptions Disp Refills     levothyroxine (SYNTHROID, LEVOTHROID) 137 MCG tablet [Pharmacy Med Name: LEVOTHYROXINE 0.137MG (137MCG) TAB] 90 tablet 0     Sig: TAKE 1 TABLET(137 MCG) BY MOUTH DAILY       Thyroid Hormones Protocol Failed - 7/19/2020  7:38 PM        Failed - TSH on file in past 12 months for patient age 12 & older     TSH   Date Value Ref Range Status   05/29/2019 0.32 0.30 - 5.00 uIU/mL Final                   Passed - Provider visit in past 12 months or next 3 months     Last office visit with prescriber/PCP: Visit date not found OR same dept: 2/19/2020 Patricio Tafoya MD OR same specialty: 2/19/2020 Patricio Tafoya MD  Last physical: 7/8/2019 Last MTM visit: Visit date not found   Next visit within 3 mo: Visit date not found  Next physical within 3 mo: Visit date not found  Prescriber OR PCP: Radha Laureano MD  Last diagnosis associated with med order: 1. Hypothyroidism  - levothyroxine (SYNTHROID, LEVOTHROID) 137 MCG tablet [Pharmacy Med Name: LEVOTHYROXINE 0.137MG (137MCG) TAB]; TAKE 1 TABLET(137 MCG) BY MOUTH DAILY  Dispense: 90 tablet; Refill: 0    If protocol passes may refill for 12 months if within 3 months of last provider visit (or a total of 15 months).

## 2021-06-09 NOTE — TELEPHONE ENCOUNTER
>1 year since last physical and labs. Please help schedule for her annual check up for continued refills. 30 day rx sent.    Mike Goodrich, CNP

## 2021-06-10 NOTE — TELEPHONE ENCOUNTER
CEA, Lipid, CMP,T4 free, TSH typically drawn.     Ok for labs prior to visit?     Pt notified this may not happen between now and tomorrow as Dr Laureano is out today and tomorrow morning. She is ok with this.

## 2021-06-10 NOTE — PROGRESS NOTES
OV  8/13/2020  Assessment:     1. Hypothyroidism, unspecified type  Thyroid Stimulating Hormone (TSH)    T4, Free   2. Mixed hyperlipidemia  Lipid Profile    Comprehensive Metabolic Panel   3. Hyperglycemia  Glycosylated Hemoglobin A1c   4. Malignant neoplasm of colon, unspecified part of colon (H)  CEA (Carcinoembryonic Antigen)   5. Cough  COVID-19 Virus (Coronavirus) Antibody Screen, IgG    COVID-19 Virus (Coronavirus) Antibody Screen, IgG         Plan:        Labs were drawn as noted. We reviewed her current symptoms, and we will continue her Levothyroxine at 137 mcg. She was congratulated on her weight loss efforts and we reviewed her estimated CV risk which is 3-6% based on the AHA and [place] calculators. She will continue to work on diet and exercise and we should monitor lipids at least every 4-6 mos. We will be in touch regarding her thyroid labs. She will continue with regular GI follow up for colon cancer, and will return to clinic for med check in 4-6 mos.       Subjective:          Charo Leung is a 55 y.o. female who presents for follow up of hypothyroidism, hyperlipidemia and impaired fasting glucose. Current symptoms: none. Patient denies change in energy level, heat / cold intolerance and palpitations. Symptoms have been well-controlled. She has been working on weight loss, and is down about 20 lb from last summer. She is currently eating a balanced diet with limited red meats, lots of veggies and salads. She's also be doing strengthening with yoga and weights. She has a history of elevated lipids and impaired fasting glucose, and her A1c has been borderline in the past. She is not fasting today.       Her father passed away last week after prolonged illness. She is doing fairly well at this time. She does note that she had some respiratory symptoms this past spring that she suspects could have been COVID. She'd like to have antibodies tested with today's labs.       She is s/p partial  colectomy for colon cancer 8 years ago. She would like to have CEA drawn along with her non-fasting labs today. Her last colonoscopy was in October 2016, and her next is due in 2021.      The following portions of the patient's history were reviewed and updated as appropriate: allergies, current medications, past family history, past medical history, past social history, past surgical history and problem list.    Review of Systems  A 12 point comprehensive review of systems was negative except as noted.    Objective:     Vitals:    08/13/20 1523   BP: 130/82   Patient Position: Sitting   Cuff Size: Adult Regular   Pulse: 74   SpO2: 97%   Weight: (!) 240 lb 3 oz (108.9 kg)      Physical Exam:  GEN: Alert and oriented, NAD,  well nourished  SKIN:  Normal skin turgor, no lesions/rashes   HEENT: moist mucous membranes, no rhinorrhea.    NECK: Normal.  No adenopathy or thyromegaly.  CV: Regular rate and rhythm, no murmurs.   LUNGS: Clear to auscultation bilaterally.    ABDOMEN: Soft, non-tender, non-distended, no masses   EXTREMITY: No edema, cyanosis  NEURO: Grossly normal.       Laboratory:    Results for orders placed or performed in visit on 08/13/20   Thyroid Stimulating Hormone (TSH)   Result Value Ref Range    TSH 0.91 0.30 - 5.00 uIU/mL   T4, Free   Result Value Ref Range    Free T4 1.0 0.7 - 1.8 ng/dL   Lipid Profile   Result Value Ref Range    Triglycerides 214 (H) <=149 mg/dL    Cholesterol 283 (H) <=199 mg/dL    LDL Calculated 178 (H) <=129 mg/dL    HDL Cholesterol 62 >=50 mg/dL    Patient Fasting > 8hrs? No    Comprehensive Metabolic Panel   Result Value Ref Range    Sodium 142 136 - 145 mmol/L    Potassium 4.2 3.5 - 5.0 mmol/L    Chloride 103 98 - 107 mmol/L    CO2 27 22 - 31 mmol/L    Anion Gap, Calculation 12 5 - 18 mmol/L    Glucose 96 70 - 125 mg/dL    BUN 12 8 - 22 mg/dL    Creatinine 0.78 0.60 - 1.10 mg/dL    GFR MDRD Af Amer >60 >60 mL/min/1.73m2    GFR MDRD Non Af Amer >60 >60 mL/min/1.73m2     Bilirubin, Total 0.5 0.0 - 1.0 mg/dL    Calcium 10.3 8.5 - 10.5 mg/dL    Protein, Total 7.5 6.0 - 8.0 g/dL    Albumin 4.5 3.5 - 5.0 g/dL    Alkaline Phosphatase 68 45 - 120 U/L    AST 20 0 - 40 U/L    ALT 25 0 - 45 U/L   Glycosylated Hemoglobin A1c   Result Value Ref Range    Hemoglobin A1c 5.4 <=5.6 %   COVID-19 Virus (Coronavirus) Antibody Screen, IgG   Result Value Ref Range    COVID -19 [place] IgG  Negative    CEA (Carcinoembryonic Antigen)   Result Value Ref Range    CEA 0.9 0.0 - 3.0 ng/mL

## 2021-06-10 NOTE — TELEPHONE ENCOUNTER
Left message to call back for: pt  Information to relay to patient:  Pt is scheduled on 8/13/2020 at 1 with Dr. Laureano. She is not avalible at that time this day. I did move pt to the 3:20 spot in case that works for her. If it doesn't, please help her schedule at a time that works for her.

## 2021-06-10 NOTE — PROGRESS NOTES
PROGRESS NOTE       SUBJECTIVE:  Charo Leung is a 52 y.o. female   Chief Complaint   Patient presents with     Medication Refill     MED CHECK AND FASTING LABS     patient  is here for medication check and lab work.   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  Dr. Gabino Rainey of [facility] has been doing her colonoscopies at 1 year which was August 16, 2013 at 3 years which was last year and now every 5 years.  Her CMP, CBC, and CEA have all been normal thus far.  Patient currently feels well and has no concerns.      Patient Active Problem List   Diagnosis     Hypothyroidism     Hyperglycemia     Colon Cancer     Vitamin D Deficiency     Dyslipidemia     Obesity     Fatigue       Current Outpatient Prescriptions   Medication Sig Dispense Refill     cholecalciferol, vitamin D3, 5,000 unit Tab Take 5,000 Units by mouth daily.       DOCOSAHEXANOIC ACID/EPA (FISH OIL ORAL) Take 300 mg by mouth daily.       lactobacillus comb no.10 (PROBIOTIC) 20 billion cell cap Take 1 capsule by mouth daily.       levothyroxine (SYNTHROID, LEVOTHROID) 125 MCG tablet TAKE 1 TABLET DAILY AT 6AM-2nd Reminder. Please schedule Yearly Exam. 90 tablet 0     fluticasone (FLONASE) 50 mcg/actuation nasal spray 2 sprays into each nostril daily.       No current facility-administered medications for this visit.        History   Smoking Status     Former Smoker     Quit date: 8/6/2004   Smokeless Tobacco     Not on file       REVIEW OF SYSTEMS: 114 point review of systems is entirely negative.        OBJECTIVE:       Vitals:    05/15/17 0810   BP: 114/72   Pulse: 70     Weight: 274 lb (124.3 kg)  Wt Readings from Last 3 Encounters:   05/15/17 (!) 274 lb (124.3 kg)   01/18/17 (!) 270 lb (122.5 kg)   08/10/16 (!) 262 lb (118.8 kg)     Body mass index is 45.6 kg/(m^2).        Physical Exam:  GENERAL APPEARANCE: A&A, NAD, well hydrated, well nourished  SKIN:  Normal skin turgor, no  lesions/rashes   EARS: TM's normal, gray with nl light reflex  OROPHARYNX: without erythema, no post nasal drainage or thrush  NECK: Supple, without lymphadenopathy, no thyroid mass  CV: RRR, no M/G/R   LUNGS: CTAB, normal respiratory effort  ABDOMEN: S&NT, no masses, no organomegaly   EXTREMITY: no edema   NEURO: no gross deficits   PSYCHIATRIC;  Mood appropriate, memory intact        ASSESSMENT/PLAN:     1. Hypothyroidism    - Thyroid Stimulating Hormone (TSH)    2. Hyperglycemia  Patient is counseled now that it has been 5 years, it is time for her to focus on eating as healthy as possible with out processed foods.  She should limit carbohydrates because of her history of elevated blood sugars.  Exercise is important to help keep sugars from bouncing around.  Patient is very interested in losing weight and we talked about strategies for accomplishing this.  - Glycosylated Hemoglobin A1c    3. Malignant neoplasm of colon    - HM2(CBC w/o Differential)  - Comprehensive Metabolic Panel  - Lipid Cascade  - CEA (Carcinoembryonic Antigen)    4. Unspecified hypothyroidism    - levothyroxine (SYNTHROID, LEVOTHROID) 125 MCG tablet; TAKE 1 TABLET DAILY  Dispense: 90 tablet; Refill: 3        The visit lasted a total of 25 minutes face to face with the patient.  Over 50% of the time spent counseling and educating the patient about all of the above.      Sulema Kaminski MD

## 2021-06-10 NOTE — TELEPHONE ENCOUNTER
Detailed message left on voicemail regarding time change. Requested patient to call back if this doesn't work for her.

## 2021-06-10 NOTE — TELEPHONE ENCOUNTER
Refill Approved    Rx renewed per Medication Renewal Policy. Medication was last renewed on 8/13/20.    Joyce Ge, [facility] Triage/Med Refill 8/18/2020     Requested Prescriptions   Pending Prescriptions Disp Refills     levothyroxine (SYNTHROID, LEVOTHROID) 137 MCG tablet 30 tablet 0       Thyroid Hormones Protocol Failed - 8/18/2020 10:28 AM        Failed - Provider visit in past 12 months or next 3 months     Last office visit with prescriber/PCP: 7/18/2018 Sulema Kaminski MD OR same dept: Visit date not found OR same specialty: Visit date not found  Last physical: 1/15/2018 Last MTM visit: Visit date not found   Next visit within 3 mo: Visit date not found  Next physical within 3 mo: Visit date not found  Prescriber OR PCP: Sulema Kaminski MD  Last diagnosis associated with med order: 1. Hypothyroidism  - levothyroxine (SYNTHROID, LEVOTHROID) 137 MCG tablet  Dispense: 30 tablet; Refill: 0    If protocol passes may refill for 12 months if within 3 months of last provider visit (or a total of 15 months).             Passed - TSH on file in past 12 months for patient age 12 & older     TSH   Date Value Ref Range Status   08/13/2020 0.91 0.30 - 5.00 uIU/mL Final

## 2021-06-10 NOTE — TELEPHONE ENCOUNTER
Patient Returning Call  Reason for call:  Returning clinic call.  Information relayed to patient:  Patient is calling to confirm that this new time works well for her and she will be in for the appointment  Patient has additional questions:  Yes  If YES, what are your questions/concerns:  Patient is asking if she would be able to come in ,in the morning for her fasting labs which she is happy to do and then will come back for the scheduled appointment.  Patient states that she gets her Thyroid, CEA tumor marker and Lipid Cascade done annually.  Please advise  Okay to leave a detailed message?: Yes

## 2021-06-11 RX ORDER — LEVOTHYROXINE SODIUM 137 UG/1
TABLET ORAL
Qty: 90 TABLET | Refills: 1 | Status: SHIPPED | OUTPATIENT
Start: 2021-06-11 | End: 2021-11-10

## 2021-06-13 NOTE — PROGRESS NOTES
Assessment/Plan:   Bee sting, local reaction.  No fever or systemic sxs.  No petechiae or purpura.  No definite cellulitis. Itchy. Patchy redness surrounding central red macular lesion consistent with a bee sting. Suspect lingering hypersensitivity reaction vs new contact dermatitis.      Benadryl oral tonight 25-50mg then every 8 hours if needed.   Stop topical benadryl.    Ice compresses  May try hydrocortisone cream topical  Follow up as needed.     Subjective:      Charo Leung is a 52 y.o. female who presents with redness around a bee sting.  The sting occurred 8 days ago on her left shin.  It was one of the small ground wasps.  She applied ice immediately and off and on since.  It became red and itchy around the central sting site, not swollen, slightly tender.  She has been applying a topical benadryl stick to the area.  The area of redness seems to be getting larger.  No fever or systemic malaise, no shortness of breath or wheeze, no throat, tongue, or lip swelling.     Allergies   Allergen Reactions     Penicillins Shortness Of Breath and Rash     Current Outpatient Prescriptions on File Prior to Visit   Medication Sig Dispense Refill     cholecalciferol, vitamin D3, 5,000 unit Tab Take 5,000 Units by mouth daily.       DOCOSAHEXANOIC ACID/EPA (FISH OIL ORAL) Take 300 mg by mouth daily.       fluticasone (FLONASE) 50 mcg/actuation nasal spray 2 sprays into each nostril daily.       lactobacillus comb no.10 (PROBIOTIC) 20 billion cell cap Take 1 capsule by mouth daily.       levothyroxine (SYNTHROID, LEVOTHROID) 137 MCG tablet Take 1 tablet (137 mcg total) by mouth daily. 90 tablet 1     No current facility-administered medications on file prior to visit.      Patient Active Problem List   Diagnosis     Hypothyroidism     Hyperglycemia     Colon Cancer     Vitamin D Deficiency     Dyslipidemia     Obesity     Fatigue       Objective:     /70 (Patient Site: Right Arm, Patient Position: Sitting,  Cuff Size: Adult Large)  Pulse 63  Temp 98.9  F (37.2  C) (Oral)   Resp 14  Wt (!) 269 lb (122 kg)  SpO2 97%  Breastfeeding? No  BMI 44.76 kg/m2    Physical  General Appearance: Alert, cooperative, no distress  Head: Normocephalic, without obvious abnormality, atraumatic  Eyes: Conjunctivae are normal.   Throat: Throat is normal.  No exudate.  No significant lesions, no swelling  Lungs: Clear to auscultation bilaterally, respirations unlabored  Heart: Regular rate and rhythm  Extremities: No lower extremity edema.  The left shin has a tiny red macular spot where the initial sting occurred.  There is patchy redness around this area.  No definite cellulitis, no vesicles, oozing, swelling or fluctuance.  Itchy more than tender. Not warm. No petechiae or purpura.   Psychiatric: Patient has a normal mood and affect.

## 2021-06-15 NOTE — PROGRESS NOTES
Charo Leung is a 52 y.o. female is here for a  Health Maintenance exam. Patient is overall doing well.  Patient currently feels well and has no concerns.    Healthy Habits:   Regular Exercise: Yes  Sunscreen Use: Yes  Healthy Diet: Yes  Dental Visits Regularly: Yes  Seat Belt: Yes  Sexually active:   Self Breast Exam Monthly:Yes  Hemoccults: No  Flex Sig: No  Colonoscopy: [id number]  Lipid Profile: Yes  Glucose Screen: Yes  Prevention of Osteoporosis: Yes  Last Dexa: No  Guns at Home:  No  Domestic Violence:  No    Current Outpatient Medications Include:    Current Outpatient Prescriptions:      cholecalciferol, vitamin D3, 5,000 unit Tab, Take 5,000 Units by mouth daily., Disp: , Rfl:      DOCOSAHEXANOIC ACID/EPA (FISH OIL ORAL), Take 300 mg by mouth daily., Disp: , Rfl:      lactobacillus comb no.10 (PROBIOTIC) 20 billion cell cap, Take 1 capsule by mouth daily., Disp: , Rfl:      levothyroxine (SYNTHROID, LEVOTHROID) 137 MCG tablet, TAKE 1 TABLET BY MOUTH DAILY, Disp: 90 tablet, Rfl: 1     magnesium 30 mg tablet, Take 30 mg by mouth 2 (two) times a day., Disp: , Rfl:      fluticasone (FLONASE) 50 mcg/actuation nasal spray, 2 sprays into each nostril daily., Disp: , Rfl:     Allergies:    Allergies   Allergen Reactions     Penicillins Shortness Of Breath and Rash       Past Medical History:   Diagnosis Date     Colon cancer 07/05/2012    Stage IIA adenocarcinoma colon  Dr Gabino [facility]     Disease of thyroid gland     Uninodular goiter       Past Surgical History:   Procedure Laterality Date     colonoscopy  08/16/2013    1 year, 3 years, every 5 years     HYSTERECTOMY  2011     RI CONIZATION CERVIX,KNIFE/LASER      Description: Cervical Conization By Laser;  Proc Date: 08/10/1995;  Comments: Dr. Bartolome Vivas at [facility]     RI CYSTOURETHROSCOPY      Description: Cystoscopy (Diagnostic);  Recorded: 01/03/2012;  Comments: recurrent UTI as a child     RI PART REMOVAL COLON W ANASTOMOSIS  2012     Partial Colectomy - Sigmoid;  Recorded: 2012;  Comments: 12 Dr Brigid RICKETTS TOTAL ABDOM HYSTERECTOMY      Description: Hysterectomy;  Recorded: 2012;  Comments: 2012  FIBROID UTERUS, AND FALLOPIAN TUBE  Ovaries remain; ; Robotic hysterectomy     THYROIDECTOMY, PARTIAL N/A 2014      Surgeon: Paddy Snow MD;  Uninodular goiter       OB History    Para Term  AB Living   2 2 2      SAB TAB Ectopic Multiple Live Births             # Outcome Date GA Lbr Jam/2nd Weight Sex Delivery Anes PTL Lv   2 Term            1 Term                   Immunization History   Administered Date(s) Administered     Hep A, Adult IM (19yr & older) 2010, 2013, 01/15/2018     IPV 2010     Influenza, inj, historic,unspecified 2010     Influenza, seasonal,quad inj 36+ mos 01/15/2018     Influenza, seasonal,quad inj 6-35 mos 2011, 10/19/2012, 2014     Influenza,seasonal quad, PF 2013     Influenza,seasonal quad, PF, 36+MOS 2015     Td,adult,historic,unspecified 2010     Tdap 2010     Typhoid, historic, Unspecified 2010       Family History   Problem Relation Age of Onset     Breast cancer Maternal Aunt 70     Breast cancer Paternal Aunt 55     Multiple myeloma Mother 71     Diabetes Mother      Hypertension Mother      Osteoarthritis Father      Other Sister      chem dep     Cervical cancer Maternal Grandmother      Leukemia Paternal Grandmother        Social History     Social History     Marital status:      Spouse name: N/A     Number of children: 2     Years of education: N/A     Occupational History     [employer]      Social History Main Topics     Smoking status: Former Smoker     Quit date: 2004     Smokeless tobacco: Never Used     Alcohol use Yes      Comment: occassional     Drug use: No     Sexual activity: Not on file     Other Topics Concern     Not on file     Social History Narrative       Last  "cholesterol:   Lab Results   Component Value Date    CHOL 284 (H) 01/15/2018    CHOL 241 (H) 05/15/2017    CHOL 235 (H) 05/06/2016     Lab Results   Component Value Date    HDL 61 01/15/2018    HDL 48 (L) 05/15/2017    HDL 50 05/06/2016     Lab Results   Component Value Date    LDLCALC 202 (H) 01/15/2018    LDLCALC 174 (H) 05/15/2017    LDLCALC 163 (H) 05/06/2016     Lab Results   Component Value Date    TRIG 105 01/15/2018    TRIG 95 05/15/2017    TRIG 110 05/06/2016     No components found for: CHOLHDL    Mammogram:   Colonoscopy  No need for further pap smears (hysterectomy)        LMP: No LMP recorded. Patient has had a hysterectomy.        Review of Systems:   General:  Denies fever, chills, HA, fatigue, myalgias, weight change    Eyes: Denies vision changes   Ears/Nose/Throat: Denies nasal congestion, rhinorrhea, ear pain or discharge, sore throat, swollen glands  Cardiovascular: Denies CP, palpitations  Respiratory:  Denies SOB, cough  Gastrointestinal:  Denies changes in bowel habits, melena, rectal bleeding,  Genitourinary: Denies changes in urine habits/frequency/dysuria, hematuria   Musculoskeletal:  Denies  joint pain or swelling or erythema, edema  Skin: Denies rashes   Neurologic: Denies weakness, paresthesia  Psychiatric: Denies mood changes   Endocrine: Denies polyuria, polydipsia, polyphagia  Heme/Lymphatic: Denies problem with bleeding   Allergic/Immunologic: Denies problem     POSITIVES: 114 point review of systems is entirely negative      PHYSICAL EXAM:    BP (!) 132/92  Pulse 68  Temp 97.8  F (36.6  C)  Ht 5' 5\" (1.651 m)  Wt (!) 270 lb (122.5 kg)  Breastfeeding? No Comment: 2011 partial , ovaries   BMI 44.93 kg/m2    Wt Readings from Last 3 Encounters:   01/15/18 (!) 270 lb (122.5 kg)   09/22/17 (!) 269 lb (122 kg)   05/15/17 (!) 274 lb (124.3 kg)       Body mass index is 44.93 kg/(m^2).    Well developed, well nourished, no acute distress.  HEENT: normocephalic/atraumatic, PERRLA/EOMI, " TMs: Gray, normal light reflex, no nasal discharge.  Oral mucosa: no erythema/exudate  Neck: No LAD/masses/thyromegaly/bruits  Lungs: clear bilaterally  Heart: regular rate and rhythm, no murmurs/gallops/rubs  Breasts: symmetric, no masses/skin changes, nipple discharge, or axillary LAD.  BSE reviewed.  Abdomen: Normal bowel sounds, soft, non-tender, non-distended, no masses, neg Dow's/McBurney's, no rebound/guarding  Genital: Normal external genitalia, no discharge, no lesions, cervix is absent.  no adnexal tenderness or fullness.  Uterus is absent.   ThinPrep was not done  Rectal: internal exam is deferred.  External exam is normal.  Lymphatics: no supraclavicular/axillary/epitrochlear/inguinal LAD. No edema.  Neuro: A&O x 3, CN II-XII intact, strength 5/5, reflexes symmetric, sensory intact to light touch.  Psych: Behavior appropriate, engaging.  Thought processes congruent, non-tangential.  Musculoskeletal: Extremities normal, atraumatic, no swelling  Skin: no rashes or lesions.      Recent Results (from the past 240 hour(s))   Thyroid Stimulating Hormone (TSH)   Result Value Ref Range    TSH 1.30 0.30 - 5.00 uIU/mL   HM2(CBC w/o Differential)   Result Value Ref Range    WBC 7.2 4.0 - 11.0 thou/uL    RBC 4.89 3.80 - 5.40 mill/uL    Hemoglobin 14.8 12.0 - 16.0 g/dL    Hematocrit 43.8 35.0 - 47.0 %    MCV 89 80 - 100 fL    MCH 30.2 27.0 - 34.0 pg    MCHC 33.8 32.0 - 36.0 g/dL    RDW 12.2 11.0 - 14.5 %    Platelets 229 140 - 440 thou/uL    MPV 8.5 7.0 - 10.0 fL   Comprehensive Metabolic Panel   Result Value Ref Range    Sodium 142 136 - 145 mmol/L    Potassium 5.0 3.5 - 5.0 mmol/L    Chloride 104 98 - 107 mmol/L    CO2 29 22 - 31 mmol/L    Anion Gap, Calculation 9 5 - 18 mmol/L    Glucose 111 70 - 125 mg/dL    BUN 14 8 - 22 mg/dL    Creatinine 0.78 0.60 - 1.10 mg/dL    GFR MDRD Af Amer >60 >60 mL/min/1.73m2    GFR MDRD Non Af Amer >60 >60 mL/min/1.73m2    Bilirubin, Total 0.8 0.0 - 1.0 mg/dL    Calcium 10.0 8.5  - 10.5 mg/dL    Protein, Total 7.6 6.0 - 8.0 g/dL    Albumin 4.1 3.5 - 5.0 g/dL    Alkaline Phosphatase 68 45 - 120 U/L    AST 23 0 - 40 U/L    ALT 33 0 - 45 U/L   Lipid Cascade   Result Value Ref Range    Cholesterol 284 (H) <=199 mg/dL    Triglycerides 105 <=149 mg/dL    HDL Cholesterol 61 >=50 mg/dL    LDL Calculated 202 (H) <=129 mg/dL    Patient Fasting > 8hrs? Unknown    Vitamin D, Total (25-Hydroxy)   Result Value Ref Range    Vitamin D, Total (25-Hydroxy) 54.6 30.0 - 80.0 ng/mL   CEA (Carcinoembryonic Antigen)   Result Value Ref Range    CEA 1.0 0.0 - 3.0 ng/mL   Glycosylated Hemoglobin A1c   Result Value Ref Range    Hemoglobin A1c 5.7 3.5 - 6.0 %       ASSESSMENT/PLAN: 52 y.o. female physical exam and pap smear.          1. Routine general medical examination at a health care facility  While her weight is stable, it is important for her to lose weight.  I recommended that she consider losing 10 pounds in the next year by cutting down on quantity of food and looking forward to eating healthy.    2. Hypothyroidism  Status post right thyroid lobectomy due to nonfunctioning goiter  - Thyroid Stimulating Hormone (TSH)    3. Hyperglycemia    - Glycosylated Hemoglobin A1c; Future  - Glycosylated Hemoglobin A1c    4. Malignant neoplasm of colon  Patient is now almost 6 years out.  Will continue yearly CEA testing.  Her siblings have had colonoscopies and her children should be screened at age 38 (10 years younger than she).    - HM2(CBC w/o Differential)  - Comprehensive Metabolic Panel  - CEA (Carcinoembryonic Antigen)    5. Vitamin D deficiency    - Vitamin D, Total (25-Hydroxy)    6. Hyperlipidemia    - Lipid Cascade    7. Need for hepatitis A immunization    - Hepatitis A adult 2 dose IM (19 yr & older)    8. Need for influenza vaccination    - Influenza, Seasonal,Quad Inj, 36+ MOS    There are no discontinued medications.    Routine health maintenance discussion:  No smoking, limited alcohol (7 or less  servings per week), 5 fruits/veg servings per day, 200 minutes of exercise per week.  Daily calcium/vitamin D guidelines, bone health, yearly mammogram after age 39/regular pap smears/colon cancer screening beginning at age 50.  Accident avoidance, sun screen.      Will contact her with the results of the labs when available.    Sulema Kaminski M.D.

## 2021-06-16 PROBLEM — E66.01 MORBID OBESITY (H): Status: ACTIVE | Noted: 2019-07-08

## 2021-06-16 NOTE — PROGRESS NOTES
S:  Patient presents with dysuria, increased urinary frequency and bladder pressure for 4 days duration of mild intensity.  ROS negative for fever chills or back pain.  PMH notable for past history of dysuria and UTI    Medications: Fish oil, Synthroid, magnesium    O:   Blood pressure 130/80, pulse 63, temperature 90.1  Minimal pain to palpation of bladder  Skin Pink and dry    UA: Positive for trace blood and leukocytes    A:   UTI    P:   Cipro 500 twice daily for 3 days (this is requested by patient as it was last time)  Increase fluids and rest  Return if not improving

## 2021-06-19 NOTE — PROGRESS NOTES
PROGRESS NOTE       SUBJECTIVE:  Charo Leung is a 53 y.o. female   Chief Complaint   Patient presents with     TICK BITE     PT HAD TICK ON BACK OF HEAD RIGHT SIDE , 5 DAYS AGO    Patient is here today to discuss tics.  She had a tick bite on the back of her scalp 5 days ago.  It was embedded but easily removed.  There is been no rash around it and it seems to be healed fine.  She brings the tick in and it is not a deer tick.  Patient is somewhat of an expert on this because she had the opportunity of checking the ticks on her property by sending 45 of them to the [employer].  They had a study going on regarding tick diseases and were requesting tick donations.  The results were that out of 45 ticks only one was positive for a tick disease.  It was not Lyme disease it was from a regular tick.  This is actually what she is concerned about because her father had ehrlichiosis and was quite sick for a while before it was diagnosed.  She denies fever, malaise, fatigue and rash.      Patient Active Problem List   Diagnosis     Hypothyroidism     Hyperglycemia     Colon Cancer     Vitamin D Deficiency     Hyperlipidemia     Obesity     Fatigue       Current Outpatient Prescriptions   Medication Sig Dispense Refill     cholecalciferol, vitamin D3, 5,000 unit Tab Take 5,000 Units by mouth daily.       DOCOSAHEXANOIC ACID/EPA (FISH OIL ORAL) Take 300 mg by mouth daily.       lactobacillus comb no.10 (PROBIOTIC) 20 billion cell cap Take 1 capsule by mouth daily.       levothyroxine (SYNTHROID, LEVOTHROID) 137 MCG tablet TAKE 1 TABLET BY MOUTH DAILY 90 tablet 1     magnesium 30 mg tablet Take 30 mg by mouth 2 (two) times a day.       fluticasone (FLONASE) 50 mcg/actuation nasal spray 2 sprays into each nostril daily.       No current facility-administered medications for this visit.        History   Smoking Status     Former Smoker     Quit date: 8/6/2004   Smokeless Tobacco     Never Used       REVIEW OF  SYSTEMS:  Patient denies fever, chills, dizziness, headache, visual change, cough, chest pain, shortness of breath, abdominal pain, extremity pain or swelling.          OBJECTIVE:       Vitals:    07/18/18 0949   BP: 117/78   Pulse: 70     Weight: 268 lb (121.6 kg)  Wt Readings from Last 3 Encounters:   07/18/18 (!) 268 lb (121.6 kg)   03/07/18 (!) 265 lb (120.2 kg)   01/15/18 (!) 270 lb (122.5 kg)     Body mass index is 44.6 kg/(m^2).        Physical Exam:  GENERAL APPEARANCE: A&A, NAD, well hydrated, well nourished  SKIN:  Normal skin turgor, no lesions/rashes.  There is a small spot on the back of her scalp consistent with an insect bite.  There is no rash surrounding and there is no tenderness here.  NECK: Supple, without lymphadenopathy, no thyroid mass  NEURO: no gross deficits   PSYCHIATRIC:  Mood appropriate, memory intact        ASSESSMENT/PLAN:     1. Tick bite  Given all the information I think is quite reasonable to not treat at this time.  If she develops any symptoms of fever aches rash fatigue we can test her quite easily for tickborne disease.  If it makes her feel better we can draw the blood today but I do not think it is necessary.  Patient prefers to wait and she will let me know.      The visit lasted a total of 25 minutes face to face with the patient.  Over 50% of the time spent counseling and educating the patient about all of the above.      Sulema Kaminski MD

## 2021-06-24 NOTE — TELEPHONE ENCOUNTER
RN cannot approve Refill Request    RN can NOT refill this medication Protocol failed and NO refill given. Last office visit: 7/18/2018 Sulema Kaminski MD Last Physical: 1/15/2018 Last MTM visit: Visit date not found Last visit same specialty: 7/18/2018 Sulema Kaminski MD.  Next visit within 3 mo: Visit date not found  Next physical within 3 mo: Visit date not found      [place], [facility] Triage/Med Refill 2/26/2019    Requested Prescriptions   Pending Prescriptions Disp Refills     levothyroxine (SYNTHROID, LEVOTHROID) 137 MCG tablet 90 tablet 0     Sig: Take 1 tablet (137 mcg total) by mouth daily.    Thyroid Hormones Protocol Failed - 2/26/2019 12:26 PM       Failed - TSH on file in past 12 months for patient age 12 & older    TSH   Date Value Ref Range Status   01/15/2018 1.30 0.30 - 5.00 uIU/mL Final                  Passed - Provider visit in past 12 months or next 3 months    Last office visit with prescriber/PCP: 7/18/2018 Sulema Kaminski MD OR same dept: 7/18/2018 Sulema Kaminski MD OR same specialty: 7/18/2018 Sulema Kaminski MD  Last physical: 1/15/2018 Last MTM visit: Visit date not found   Next visit within 3 mo: Visit date not found  Next physical within 3 mo: Visit date not found  Prescriber OR PCP: Sulema Kaminski MD  Last diagnosis associated with med order: 1. Hypothyroidism  - levothyroxine (SYNTHROID, LEVOTHROID) 137 MCG tablet; Take 1 tablet (137 mcg total) by mouth daily.  Dispense: 90 tablet; Refill: 0    If protocol passes may refill for 12 months if within 3 months of last provider visit (or a total of 15 months).

## 2021-06-24 NOTE — TELEPHONE ENCOUNTER
Left message to call back for: Pt.  Information to relay to patient:  Information below.     Nery lOsen, CMA

## 2021-06-25 NOTE — TELEPHONE ENCOUNTER
8/13/20 establish care with Dr Laureano    Patient asking for refill of thyroid medication until August when she's due for her annual check.

## 2021-06-26 ENCOUNTER — HEALTH MAINTENANCE LETTER (OUTPATIENT)
Age: 56
End: 2021-06-26

## 2021-07-22 ENCOUNTER — RECORDS - HEALTHEAST (OUTPATIENT)
Dept: FAMILY MEDICINE | Facility: CLINIC | Age: 56
End: 2021-07-22

## 2021-07-22 DIAGNOSIS — Z12.31 OTHER SCREENING MAMMOGRAM: ICD-10-CM

## 2021-08-12 ENCOUNTER — MYC MEDICAL ADVICE (OUTPATIENT)
Dept: FAMILY MEDICINE | Facility: CLINIC | Age: 56
End: 2021-08-12

## 2021-08-12 DIAGNOSIS — E03.9 HYPOTHYROIDISM, UNSPECIFIED TYPE: Primary | ICD-10-CM

## 2021-08-12 DIAGNOSIS — Z51.81 ENCOUNTER FOR THERAPEUTIC DRUG MONITORING: ICD-10-CM

## 2021-08-12 DIAGNOSIS — E78.2 MIXED HYPERLIPIDEMIA: ICD-10-CM

## 2021-09-14 ENCOUNTER — LAB (OUTPATIENT)
Dept: LAB | Facility: CLINIC | Age: 56
End: 2021-09-14
Payer: COMMERCIAL

## 2021-09-14 DIAGNOSIS — E03.9 HYPOTHYROIDISM, UNSPECIFIED TYPE: ICD-10-CM

## 2021-09-14 DIAGNOSIS — E78.2 MIXED HYPERLIPIDEMIA: ICD-10-CM

## 2021-09-14 DIAGNOSIS — Z51.81 ENCOUNTER FOR THERAPEUTIC DRUG MONITORING: ICD-10-CM

## 2021-09-14 DIAGNOSIS — C18.9 MALIGNANT NEOPLASM OF COLON, UNSPECIFIED PART OF COLON (H): ICD-10-CM

## 2021-09-14 LAB
ANION GAP SERPL CALCULATED.3IONS-SCNC: 9 MMOL/L (ref 5–18)
BUN SERPL-MCNC: 14 MG/DL (ref 8–22)
CALCIUM SERPL-MCNC: 10.3 MG/DL (ref 8.5–10.5)
CHLORIDE BLD-SCNC: 105 MMOL/L (ref 98–107)
CHOLEST SERPL-MCNC: 251 MG/DL
CO2 SERPL-SCNC: 29 MMOL/L (ref 22–31)
CREAT SERPL-MCNC: 0.84 MG/DL (ref 0.6–1.1)
ERYTHROCYTE [DISTWIDTH] IN BLOOD BY AUTOMATED COUNT: 12.7 % (ref 10–15)
FASTING STATUS PATIENT QL REPORTED: YES
GFR SERPL CREATININE-BSD FRML MDRD: 77 ML/MIN/1.73M2
GLUCOSE BLD-MCNC: 105 MG/DL (ref 70–125)
HCT VFR BLD AUTO: 43.8 % (ref 35–47)
HDLC SERPL-MCNC: 64 MG/DL
HGB BLD-MCNC: 14.4 G/DL (ref 11.7–15.7)
LDLC SERPL CALC-MCNC: 166 MG/DL
MCH RBC QN AUTO: 30.8 PG (ref 26.5–33)
MCHC RBC AUTO-ENTMCNC: 32.9 G/DL (ref 31.5–36.5)
MCV RBC AUTO: 94 FL (ref 78–100)
PLATELET # BLD AUTO: 236 10E3/UL (ref 150–450)
POTASSIUM BLD-SCNC: 4.9 MMOL/L (ref 3.5–5)
RBC # BLD AUTO: 4.67 10E6/UL (ref 3.8–5.2)
SODIUM SERPL-SCNC: 143 MMOL/L (ref 136–145)
T4 FREE SERPL-MCNC: 1.14 NG/DL (ref 0.7–1.8)
TRIGL SERPL-MCNC: 107 MG/DL
TSH SERPL DL<=0.005 MIU/L-ACNC: 0.97 UIU/ML (ref 0.3–5)
WBC # BLD AUTO: 6 10E3/UL (ref 4–11)

## 2021-09-14 PROCEDURE — 80061 LIPID PANEL: CPT

## 2021-09-14 PROCEDURE — 85027 COMPLETE CBC AUTOMATED: CPT

## 2021-09-14 PROCEDURE — 84443 ASSAY THYROID STIM HORMONE: CPT

## 2021-09-14 PROCEDURE — 80048 BASIC METABOLIC PNL TOTAL CA: CPT

## 2021-09-14 PROCEDURE — 36415 COLL VENOUS BLD VENIPUNCTURE: CPT

## 2021-09-14 PROCEDURE — 84439 ASSAY OF FREE THYROXINE: CPT

## 2021-09-14 PROCEDURE — 82378 CARCINOEMBRYONIC ANTIGEN: CPT

## 2021-09-16 ENCOUNTER — MYC MEDICAL ADVICE (OUTPATIENT)
Dept: FAMILY MEDICINE | Facility: CLINIC | Age: 56
End: 2021-09-16

## 2021-09-16 DIAGNOSIS — C18.9 MALIGNANT NEOPLASM OF COLON, UNSPECIFIED PART OF COLON (H): Primary | ICD-10-CM

## 2021-09-16 LAB — CEA SERPL-MCNC: 1 NG/ML (ref 0–3)

## 2021-09-16 NOTE — TELEPHONE ENCOUNTER
Please check to see if lab can add CEA to prior specimen. I was able to enter it, but just want to double check if it needs any special handling.  thx

## 2021-09-17 NOTE — TELEPHONE ENCOUNTER
Dr laureano has added order for CEA.  Please notify lab and patient.  Dr Laureano should be in touch once lab returns.    Thanks,  Vitaly Pacheco MD for [facility]

## 2021-09-27 ENCOUNTER — OFFICE VISIT (OUTPATIENT)
Dept: FAMILY MEDICINE | Facility: CLINIC | Age: 56
End: 2021-09-27
Payer: COMMERCIAL

## 2021-09-27 VITALS
SYSTOLIC BLOOD PRESSURE: 120 MMHG | HEIGHT: 65 IN | HEART RATE: 70 BPM | WEIGHT: 247.06 LBS | BODY MASS INDEX: 41.16 KG/M2 | OXYGEN SATURATION: 98 % | DIASTOLIC BLOOD PRESSURE: 78 MMHG

## 2021-09-27 DIAGNOSIS — E78.2 MIXED HYPERLIPIDEMIA: ICD-10-CM

## 2021-09-27 DIAGNOSIS — Z23 FLU VACCINE NEED: ICD-10-CM

## 2021-09-27 DIAGNOSIS — Z12.11 SCREEN FOR COLON CANCER: ICD-10-CM

## 2021-09-27 DIAGNOSIS — Z00.00 ROUTINE ADULT HEALTH MAINTENANCE: Primary | ICD-10-CM

## 2021-09-27 DIAGNOSIS — N95.1 MENOPAUSAL FLUSHING: ICD-10-CM

## 2021-09-27 DIAGNOSIS — E03.9 HYPOTHYROIDISM, UNSPECIFIED TYPE: ICD-10-CM

## 2021-09-27 DIAGNOSIS — Z85.038 HISTORY OF COLON CANCER IN ADULTHOOD: ICD-10-CM

## 2021-09-27 PROCEDURE — 99396 PREV VISIT EST AGE 40-64: CPT | Mod: 25 | Performed by: FAMILY MEDICINE

## 2021-09-27 PROCEDURE — 90471 IMMUNIZATION ADMIN: CPT | Performed by: FAMILY MEDICINE

## 2021-09-27 PROCEDURE — 90682 RIV4 VACC RECOMBINANT DNA IM: CPT | Performed by: FAMILY MEDICINE

## 2021-09-27 RX ORDER — LACTOBACILLUS RHAMNOSUS GG 10B CELL
CAPSULE ORAL
COMMUNITY
Start: 2018-09-01

## 2021-09-27 RX ORDER — VENLAFAXINE HYDROCHLORIDE 37.5 MG/1
37.5 TABLET, EXTENDED RELEASE ORAL DAILY
Qty: 40 TABLET | Refills: 3 | Status: SHIPPED | OUTPATIENT
Start: 2021-09-27 | End: 2023-01-09

## 2021-09-27 ASSESSMENT — MIFFLIN-ST. JEOR: SCORE: 1706.55
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
## 2021-10-12 ENCOUNTER — HOSPITAL ENCOUNTER (OUTPATIENT)
Dept: MAMMOGRAPHY | Facility: CLINIC | Age: 56
Discharge: HOME | End: 2021-10-12
Attending: FAMILY MEDICINE | Admitting: FAMILY MEDICINE
Payer: COMMERCIAL

## 2021-10-12 DIAGNOSIS — Z12.31 VISIT FOR SCREENING MAMMOGRAM: ICD-10-CM

## 2021-10-12 PROCEDURE — 77063 BREAST TOMOSYNTHESIS BI: CPT

## 2021-11-10 DIAGNOSIS — E03.9 HYPOTHYROIDISM: ICD-10-CM

## 2021-11-10 NOTE — TELEPHONE ENCOUNTER
Medication: levothyroxine 137mcg  Last Date Filled: 6/11/2021  Last appointment addressing medication: 9/27/21  Last B/P:  BP Readings from Last 3 Encounters:   09/27/21 120/78   03/31/21 130/80   08/13/20 130/82     Last labs pertaining to refill: 9/14/21      Pend medication and associate diagnosis before routing to Provider for review.       If patient has not been seen in over 1 year, pend 30 day supply and notify patient they are due for an appointment before any further refills.

## 2021-11-12 RX ORDER — LEVOTHYROXINE SODIUM 137 UG/1
TABLET ORAL
Qty: 90 TABLET | Refills: 3 | Status: SHIPPED | OUTPATIENT
Start: 2021-11-12 | End: 2023-01-09

## 2021-11-16 ENCOUNTER — TRANSFERRED RECORDS (OUTPATIENT)
Dept: MULTI SPECIALTY CLINIC | Facility: CLINIC | Age: 56
End: 2021-11-16

## 2021-12-21 ENCOUNTER — IMMUNIZATION (OUTPATIENT)
Dept: NURSING | Facility: CLINIC | Age: 56
End: 2021-12-21
Payer: COMMERCIAL

## 2021-12-21 PROCEDURE — 91300 PR COVID VAC PFIZER DIL RECON 30 MCG/0.3 ML IM: CPT

## 2021-12-21 PROCEDURE — 0004A PR COVID VAC PFIZER DIL RECON 30 MCG/0.3 ML IM: CPT

## 2022-03-24 ENCOUNTER — E-VISIT (OUTPATIENT)
Dept: URGENT CARE | Facility: URGENT CARE | Age: 57
End: 2022-03-24
Payer: COMMERCIAL

## 2022-03-24 DIAGNOSIS — N39.0 ACUTE UTI (URINARY TRACT INFECTION): Primary | ICD-10-CM

## 2022-03-24 PROCEDURE — 99421 OL DIG E/M SVC 5-10 MIN: CPT | Performed by: PHYSICIAN ASSISTANT

## 2022-03-24 RX ORDER — NITROFURANTOIN 25; 75 MG/1; MG/1
100 CAPSULE ORAL 2 TIMES DAILY
Qty: 10 CAPSULE | Refills: 0 | Status: SHIPPED | OUTPATIENT
Start: 2022-03-24 | End: 2022-03-29

## 2022-03-24 NOTE — PATIENT INSTRUCTIONS
Dear Charo Leugn    After reviewing your responses, I've been able to diagnose you with a urinary tract infection, which is a common infection of the bladder with bacteria.  This is not a sexually transmitted infection, though urinating immediately after intercourse can help prevent infections.  Drinking lots of fluids is also helpful to clear your current infection and prevent the next one.      I have sent a prescription for antibiotics to your pharmacy to treat this infection.    It is important that you take all of your prescribed medication even if your symptoms are improving after a few doses.  Taking all of your medicine helps prevent the symptoms from returning.     If your symptoms worsen, you develop pain in your back or stomach, develop fevers, or are not improving in 5 days, please contact your primary care provider for an appointment or visit any of our convenient Walk-in or Urgent Care Centers to be seen, which can be found on our website here.    Thanks again for choosing us as your health care partner,    Jj Bradford PA-C    Urinary Tract Infections in Women  Urinary tract infections (UTIs) are most often caused by bacteria. These bacteria enter the urinary tract. The bacteria may come from inside the body. Or they may travel from the skin outside the rectum or vagina into the urethra. Female anatomy makes it easy for bacteria from the bowel to enter a woman s urinary tract, which is the most common source of UTI. This means women develop UTIs more often than men. Pain in or around the urinary tract is a common UTI symptom. But the only way to know for sure if you have a UTI for the healthcare provider to test your urine. The two tests that may be done are the urinalysis and urine culture.     Types of UTIs    Cystitis. A bladder infection (cystitis) is the most common UTI in women. You may have urgent or frequent need to pee. You may also have pain, burning when you pee, and bloody  urine.    Urethritis. This is an inflamed urethra, which is the tube that carries urine from the bladder to outside the body. You may have lower stomach or back pain. You may also have urgent or frequent need to pee.    Pyelonephritis. This is a kidney infection. If not treated, it can be serious and damage your kidneys. In severe cases, you may need to stay in the hospital. You may have a fever and lower back pain.    Medicines to treat a UTI  Most UTIs are treated with antibiotics. These kill the bacteria. The length of time you need to take them depends on the type of infection. It may be as short as 3 days. If you have repeated UTIs, you may need a low-dose antibiotic for several months. Take antibiotics exactly as directed. Don t stop taking them until all of the medicine is gone. If you stop taking the antibiotic too soon, the infection may not go away. You may also develop a resistance to the antibiotic. This can make it much harder to treat.   Lifestyle changes to treat and prevent UTIs   The lifestyle changes below will help get rid of your UTI. They may also help prevent future UTIs.     Drink plenty of fluids. This includes water, juice, or other caffeine-free drinks. Fluids help flush bacteria out of your body.    Empty your bladder. Always empty your bladder when you feel the urge to pee. And always pee before going to sleep. Urine that stays in your bladder can lead to infection. Try to pee before and after sex as well.    Practice good personal hygiene. Wipe yourself from front to back after using the toilet. This helps keep bacteria from getting into the urethra.    Use condoms during sex. These help prevent UTIs caused by sexually transmitted bacteria. Also don't use spermicides during sex. These can increase the risk for UTIs. Choose other forms of birth control instead. For women who tend to get UTIs after sex, a low-dose of a preventive antibiotic may be used. Be sure to discuss this option with  your healthcare provider.    Follow up with your healthcare provider as directed. He or she may test to make sure the infection has cleared. If needed, more treatment may be started.  [employer]ush[employer] last reviewed this educational content on 7/1/2019 2000-2021 [employer]. All rights reserved. This information is not intended as a substitute for professional medical care. Always follow your healthcare professional's instructions.

## 2022-03-31 ENCOUNTER — OFFICE VISIT (OUTPATIENT)
Dept: FAMILY MEDICINE | Facility: CLINIC | Age: 57
End: 2022-03-31
Payer: COMMERCIAL

## 2022-03-31 ENCOUNTER — NURSE TRIAGE (OUTPATIENT)
Dept: NURSING | Facility: CLINIC | Age: 57
End: 2022-03-31
Payer: COMMERCIAL

## 2022-03-31 VITALS
SYSTOLIC BLOOD PRESSURE: 149 MMHG | HEART RATE: 77 BPM | OXYGEN SATURATION: 98 % | TEMPERATURE: 98.1 F | DIASTOLIC BLOOD PRESSURE: 87 MMHG | WEIGHT: 259 LBS | RESPIRATION RATE: 14 BRPM | BODY MASS INDEX: 43.1 KG/M2

## 2022-03-31 DIAGNOSIS — R35.0 URINARY FREQUENCY: ICD-10-CM

## 2022-03-31 DIAGNOSIS — R03.0 ELEVATED BLOOD PRESSURE READING WITHOUT DIAGNOSIS OF HYPERTENSION: ICD-10-CM

## 2022-03-31 DIAGNOSIS — N30.00 ACUTE CYSTITIS WITHOUT HEMATURIA: Primary | ICD-10-CM

## 2022-03-31 LAB
ALBUMIN UR-MCNC: NEGATIVE MG/DL
APPEARANCE UR: CLEAR
BILIRUB UR QL STRIP: NEGATIVE
COLOR UR AUTO: YELLOW
GLUCOSE UR STRIP-MCNC: NEGATIVE MG/DL
HGB UR QL STRIP: NEGATIVE
KETONES UR STRIP-MCNC: NEGATIVE MG/DL
LEUKOCYTE ESTERASE UR QL STRIP: NEGATIVE
NITRATE UR QL: NEGATIVE
PH UR STRIP: 7 [PH] (ref 5–8)
SP GR UR STRIP: 1.01 (ref 1–1.03)
UROBILINOGEN UR STRIP-ACNC: 0.2 E.U./DL

## 2022-03-31 PROCEDURE — 81003 URINALYSIS AUTO W/O SCOPE: CPT | Performed by: FAMILY MEDICINE

## 2022-03-31 PROCEDURE — 87086 URINE CULTURE/COLONY COUNT: CPT | Performed by: FAMILY MEDICINE

## 2022-03-31 PROCEDURE — 99214 OFFICE O/P EST MOD 30 MIN: CPT | Performed by: FAMILY MEDICINE

## 2022-03-31 RX ORDER — VENLAFAXINE HYDROCHLORIDE 37.5 MG/1
CAPSULE, EXTENDED RELEASE ORAL
COMMUNITY
Start: 2021-09-27 | End: 2023-01-09

## 2022-03-31 RX ORDER — CIPROFLOXACIN 500 MG/1
500 TABLET, FILM COATED ORAL 2 TIMES DAILY
Qty: 6 TABLET | Refills: 0 | Status: SHIPPED | OUTPATIENT
Start: 2022-03-31 | End: 2022-04-03

## 2022-03-31 NOTE — PATIENT INSTRUCTIONS

## 2022-03-31 NOTE — TELEPHONE ENCOUNTER
S-(situation): Call from patient who was given nitrofurantoin for 5 days after a virtual visit on 3/24/22. She finished the med yesterday.    Patient says she is feeing better but still has lingering symptoms of mild lower pelvic pressure and frequency.    Denies fever or flank pain.    Patient asked if she could get more medication to clear it up before she goes on vacation tomorrow.      B-(background):       A-(assessment): needs to be re-evaluatd      R-(recommendations): be seen today via clinic or urgent care.       Caller verbalized understanding.          Cristo Elias RN/[facility]    Reason for Disposition    [1] Taking antibiotic > 72 hours (3 days) for UTI AND [2] painful urination or frequency not improved    Additional Information    Negative: Shock suspected (e.g., cold/pale/clammy skin, too weak to stand, low BP, rapid pulse)    Negative: Sounds like a life-threatening emergency to the triager    Negative: [1] Unable to urinate (or only a few drops) > 4 hours AND     [2] bladder feels very full (e.g., palpable bladder or strong urge to urinate)    Negative: Passing pure blood or large blood clots (i.e., size > a dime)  (Exceptions: flecks, small strands, or pinkish-red color)    Negative: Fever > 103 F (39.4 C)    Negative: Patient sounds very sick or weak to the triager    Negative: [1] SEVERE pain (e.g., excruciating) AND [2] no improvement 2 hours after pain medications    Negative: [1] Fever > 100.0 F (37.8 C) AND [2] new onset since starting antibiotics    Negative: [1] Side (flank) or lower back pain AND [2] new onset since starting antibiotics    Negative: [1] Taking antibiotic > 24 hours for UTI AND [2] flank or lower back pain worsening    Negative: [1] Vomiting 2 or more times AND [2] interferes with taking oral antibiotic    Negative: [1] Taking antibiotic > 24 hours for UTI (urinary tract or bladder infection) AND [2] fever persists    Protocols used: URINARY TRACT INFECTION  ON ANTIBIOTIC FOLLOW-UP CALL - FEMALE-AARON

## 2022-03-31 NOTE — PROGRESS NOTES
Clinical Decision Making:    At the end of the encounter, I discussed results, diagnosis, medications. Discussed red flags for immediate return to clinic/ER, as well as indications for follow up if no improvement. Patient understood and agreed to plan. Patient was stable for discharge.      ICD-10-CM    1. Acute cystitis without hematuria  N30.00 ciprofloxacin (CIPRO) 500 MG tablet     Urine Culture Aerobic Bacterial - lab collect     Urine Culture Aerobic Bacterial - lab collect   2. Urinary frequency  R35.0 UA macro with reflex to Microscopic and Culture - Clinc Collect   3. Elevated blood pressure reading without diagnosis of hypertension  R03.0      Patient has an acute UTI which has improved but she is not completely better.  We will treat with Cipro 500 mg twice daily for 3 days which has worked well for her in the past.  Although UA is negative we will culture it out to see if we grow out any drug-resistant organisms.  Discussed possibility of yeast or bacterial vaginitis but patient does not believe her symptoms are vaginal in nature.  Recommended taking her blood pressure at home 2 or 3 times a week and writing down the date and time as well as the blood pressure.  Keep a list of these readings and bring it into her next regularly scheduled appointment with her primary care physician.  Follow-up if not improving as anticipated      Patient Instructions   Patient Education     Bladder Infection, Female (Adult)     Urine normally doesn't have any germs (bacteria) in it. But bacteria can get into the urinary tract from the skin around the rectum. Or they can travel in the blood from other parts of the body. Once they are in your urinary tract, they can cause infection in these areas:    The urethra (urethritis)    The bladder (cystitis)    The kidneys (pyelonephritis)  The most common place for an infection is in the bladder. This is called a bladder infection. This is one of the most common infections in  women. Most bladder infections are easily treated. They are not serious unless the infection spreads to the kidney.  The terms bladder infection, UTI, and cystitis are often used to describe the same thing. But they are not always the same. Cystitis is an inflammation of the bladder. The most common cause of cystitis is an infection.  Symptoms  The infection causes inflammation in the urethra and bladder. This causes many of the symptoms. The most common symptoms of a bladder infection are:    Pain or burning when urinating    Having to urinate more often than normal    Urgent need to urinate    Only a small amount of urine comes out    Blood in urine    Belly (abdominal) discomfort. This is often in the lower belly above the pubic bone.    Cloudy urine    Strong- or bad-smelling urine    Unable to urinate (urinary retention)    Unable to hold urine in (urinary incontinence)    Fever    Loss of appetite    Confusion (in older adults)  Causes  Bladder infections are not contagious. You can't get one from someone else, from a toilet seat, or from sharing a bath.  The most common cause of bladder infections is bacteria from the bowels. The bacteria get onto the skin around the opening of the urethra. From there, they can get into the urine. Then they travel up to the bladder, causing inflammation and infection. This often happens because of:    Wiping incorrectly after urinating. Always wipe from front to back.    Bowel incontinence    Pregnancy    Procedures such as having a catheter put in    Older age    Not emptying your bladder. This can give bacteria a chance to grow in your urine.    Fluid loss (dehydration)    Constipation    Having sex    Using a diaphragm for birth control   Treatment  Bladder infections are diagnosed by a urine test and urine culture. They are treated with antibiotics. They often clear up quickly without problems. Treatment helps prevent a more serious kidney infection.  Medicines  Medicines  can help in the treatment of a bladder infection:    Take antibiotics until they are used up, even if you feel better. It's important to finish them to make sure the infection has cleared.    You can use acetaminophen or ibuprofen for pain, fever, or discomfort, unless another medicine was prescribed. If you have long-term (chronic) liver or kidney disease, talk with your healthcare provider before using these medicines. Also talk with your provider if you've ever had a stomach ulcer or GI (gastrointestinal) bleeding, or are taking blood-thinner medicines.    If you are given phenazopydridine to reduce burning with urination, it will make your urine a bright orange color. This can stain clothing.  Care and prevention  These self-care steps can help prevent future infections:    Drink plenty of fluids. This helps to prevent dehydration and flush out your bladder. Do this unless you must restrict fluids for other health reasons, or your healthcare provider told you not to.    Clean yourself correctly after going to the bathroom. Wipe from front to back after using the toilet. This helps prevent the spread of bacteria.    Urinate more often. Don't try to hold urine in for a long time.    Wear loose-fitting clothes and cotton underwear. Don't wear tight-fitting pants.    Improve your diet and prevent constipation. Eat more fresh fruits and vegetables, and fiber. Eat less junk foods and fatty foods.    Don't have sex until your symptoms are gone.    Don't have caffeine, alcohol, and spicy foods. These can irritate your bladder.    Urinate right after you have sex to flush out your bladder.    If you use birth control pills and have frequent bladder infections, discuss it with your healthcare provider.  Follow-up care  Call your healthcare provider if all symptoms are not gone after 3 days of treatment. This is especially important if you have repeat infections.  If a culture was done, you will be told if your treatment  needs to be changed. If directed, you can call to find out the results.  If X-rays were done, you will be told if the results will affect your treatment.  Call 911  Call 911 if any of the following occur:    Trouble breathing    Hard to wake up or confusion    Fainting (loss of consciousness)    Fast heart rate  When to get medical advice  Call your healthcare provider right away if any of these occur:    Fever of 100.4 F (38.0 C) or higher, or as directed by your healthcare provider    Symptoms are not better after 3 days of treatment    Back or belly pain that gets worse    Repeated vomiting, or unable to keep medicine down    Weakness or dizziness    Vaginal discharge    Pain, redness, or swelling in the outer vaginal area (labia)  [employer] last reviewed this educational content on 11/1/2019 2000-2021 [employer]. All rights reserved. This information is not intended as a substitute for professional medical care. Always follow your healthcare professional's instructions.              No follow-ups on file.      chief complaint    HPI:  Charo Leung is a 57 year old female who presents today complaining of ongoing urinary symptoms.  She was having dysuria as well as urinary urgency and frequency and about a week ago had done a virtual visit.  She has a positive history of urinary tract infections so she was familiar with the symptoms.  She was treated with Macrobid twice daily for 5 days and she just completed that treatment.  She overall is feeling better but she does not feel completely better.  She is concerned because she is going out of town in a few days.  She denies back pain, fever, nausea  No vaginal itching, discharge, odor  No concern about sexually transmitted infections    She has never checked her blood pressure at home.  She does note that since the last 2 years of Covid and being more socially isolated at home with working from home and also a couple of losses in her family  she has noticed more social anxiety when coming to the clinic or sometimes going to other public places.  She does think she has a blood pressure machine at home.  Patient is seeing a counselor.    History obtained from the patient.    Problem List:  2019: Morbid obesity (H)  2012: History of colon cancer in adulthood  Hypothyroidism  Hyperglycemia  Vitamin D Deficiency  Hyperlipidemia  Fatigue      Past Medical History:   Diagnosis Date     Colon cancer (H) 2012    Stage IIA adenocarcinoma colon  Dr Gabino [facility]ONT[facility] ONC     Disease of thyroid gland     Uninodular goiter       Social History     Tobacco Use     Smoking status: Former Smoker     Quit date: 2004     Years since quittin.6     Smokeless tobacco: Never Used   Substance Use Topics     Alcohol use: Yes     Comment: Alcoholic Drinks/day: occassional       Review of systems  negative except listed in HPI    Vitals:    22 1533   BP: (!) 149/87   Pulse: 77   Resp: 14   Temp: 98.1  F (36.7  C)   TempSrc: Oral   SpO2: 98%   Weight: 117.5 kg (259 lb)       Physical Exam  Vitals noted and within normal limits except elevated systolic blood pressure  In general patient is alert, oriented and in no acute distress.  Back with no CVA tenderness.  Abdomen soft, non-tender and not distended.  UA within normal limits

## 2022-04-02 LAB — BACTERIA UR CULT: NORMAL

## 2022-05-25 ENCOUNTER — IMMUNIZATION (OUTPATIENT)
Dept: NURSING | Facility: CLINIC | Age: 57
End: 2022-05-25
Payer: COMMERCIAL

## 2022-05-25 PROCEDURE — 0054A COVID-19,PF,PFIZER (12+ YRS): CPT

## 2022-05-25 PROCEDURE — 91305 COVID-19,PF,PFIZER (12+ YRS): CPT

## 2022-09-25 ENCOUNTER — HEALTH MAINTENANCE LETTER (OUTPATIENT)
Age: 57
End: 2022-09-25

## 2022-10-26 ENCOUNTER — IMMUNIZATION (OUTPATIENT)
Dept: FAMILY MEDICINE | Facility: CLINIC | Age: 57
End: 2022-10-26
Payer: COMMERCIAL

## 2022-10-26 PROCEDURE — 91312 COVID-19,PF,PFIZER BOOSTER BIVALENT: CPT

## 2022-10-26 PROCEDURE — 0124A COVID-19,PF,PFIZER BOOSTER BIVALENT: CPT

## 2022-11-11 ENCOUNTER — HOSPITAL ENCOUNTER (OUTPATIENT)
Dept: MAMMOGRAPHY | Facility: CLINIC | Age: 57
Discharge: HOME | End: 2022-11-11
Attending: FAMILY MEDICINE | Admitting: FAMILY MEDICINE
Payer: COMMERCIAL

## 2022-11-11 DIAGNOSIS — Z12.31 VISIT FOR SCREENING MAMMOGRAM: ICD-10-CM

## 2022-11-11 PROCEDURE — 77067 SCR MAMMO BI INCL CAD: CPT

## 2023-01-08 ASSESSMENT — ENCOUNTER SYMPTOMS
FEVER: 0
COUGH: 0
SHORTNESS OF BREATH: 0
EYE PAIN: 0
HEADACHES: 0
ARTHRALGIAS: 0
WEAKNESS: 0
BREAST MASS: 0
PARESTHESIAS: 0
NERVOUS/ANXIOUS: 1
MYALGIAS: 0
FREQUENCY: 0
CONSTIPATION: 0
HEARTBURN: 0
HEMATURIA: 0
ABDOMINAL PAIN: 0
DIARRHEA: 0
PALPITATIONS: 0
DIZZINESS: 0
SORE THROAT: 0
NAUSEA: 0
CHILLS: 0
DYSURIA: 0
HEMATOCHEZIA: 0
JOINT SWELLING: 0

## 2023-01-09 ENCOUNTER — OFFICE VISIT (OUTPATIENT)
Dept: FAMILY MEDICINE | Facility: CLINIC | Age: 58
End: 2023-01-09
Payer: COMMERCIAL

## 2023-01-09 VITALS
HEIGHT: 65 IN | SYSTOLIC BLOOD PRESSURE: 124 MMHG | WEIGHT: 254.9 LBS | RESPIRATION RATE: 14 BRPM | HEART RATE: 70 BPM | DIASTOLIC BLOOD PRESSURE: 84 MMHG | OXYGEN SATURATION: 97 % | BODY MASS INDEX: 42.47 KG/M2

## 2023-01-09 DIAGNOSIS — E66.01 MORBID OBESITY (H): ICD-10-CM

## 2023-01-09 DIAGNOSIS — C18.9 MALIGNANT NEOPLASM OF COLON, UNSPECIFIED PART OF COLON (H): ICD-10-CM

## 2023-01-09 DIAGNOSIS — Z00.00 ROUTINE GENERAL MEDICAL EXAMINATION AT A HEALTH CARE FACILITY: Primary | ICD-10-CM

## 2023-01-09 DIAGNOSIS — E03.9 HYPOTHYROIDISM, UNSPECIFIED TYPE: ICD-10-CM

## 2023-01-09 DIAGNOSIS — L72.0 CYST OF SKIN AND SUBCUTANEOUS TISSUE: ICD-10-CM

## 2023-01-09 LAB
ALBUMIN SERPL BCG-MCNC: 4.5 G/DL (ref 3.5–5.2)
ALP SERPL-CCNC: 59 U/L (ref 35–104)
ALT SERPL W P-5'-P-CCNC: 26 U/L (ref 10–35)
ANION GAP SERPL CALCULATED.3IONS-SCNC: 12 MMOL/L (ref 7–15)
AST SERPL W P-5'-P-CCNC: 21 U/L (ref 10–35)
BILIRUB SERPL-MCNC: 0.7 MG/DL
BUN SERPL-MCNC: 11.3 MG/DL (ref 6–20)
CALCIUM SERPL-MCNC: 9.4 MG/DL (ref 8.6–10)
CEA SERPL-MCNC: 0.7 NG/ML
CHLORIDE SERPL-SCNC: 106 MMOL/L (ref 98–107)
CHOLEST SERPL-MCNC: 259 MG/DL
CREAT SERPL-MCNC: 0.82 MG/DL (ref 0.51–0.95)
DEPRECATED HCO3 PLAS-SCNC: 26 MMOL/L (ref 22–29)
ERYTHROCYTE [DISTWIDTH] IN BLOOD BY AUTOMATED COUNT: 13.1 % (ref 10–15)
GFR SERPL CREATININE-BSD FRML MDRD: 82 ML/MIN/1.73M2
GLUCOSE SERPL-MCNC: 107 MG/DL (ref 70–99)
HCT VFR BLD AUTO: 41.7 % (ref 35–47)
HCV AB SERPL QL IA: NONREACTIVE
HDLC SERPL-MCNC: 60 MG/DL
HGB BLD-MCNC: 13.6 G/DL (ref 11.7–15.7)
HIV 1+2 AB+HIV1 P24 AG SERPL QL IA: NONREACTIVE
LDLC SERPL CALC-MCNC: 175 MG/DL
MCH RBC QN AUTO: 30.2 PG (ref 26.5–33)
MCHC RBC AUTO-ENTMCNC: 32.6 G/DL (ref 31.5–36.5)
MCV RBC AUTO: 93 FL (ref 78–100)
NONHDLC SERPL-MCNC: 199 MG/DL
PLATELET # BLD AUTO: 229 10E3/UL (ref 150–450)
POTASSIUM SERPL-SCNC: 4.6 MMOL/L (ref 3.4–5.3)
PROT SERPL-MCNC: 7.1 G/DL (ref 6.4–8.3)
RBC # BLD AUTO: 4.5 10E6/UL (ref 3.8–5.2)
SODIUM SERPL-SCNC: 144 MMOL/L (ref 136–145)
TRIGL SERPL-MCNC: 118 MG/DL
TSH SERPL DL<=0.005 MIU/L-ACNC: 0.6 UIU/ML (ref 0.3–4.2)
WBC # BLD AUTO: 5.5 10E3/UL (ref 4–11)

## 2023-01-09 PROCEDURE — 84443 ASSAY THYROID STIM HORMONE: CPT | Performed by: FAMILY MEDICINE

## 2023-01-09 PROCEDURE — 82378 CARCINOEMBRYONIC ANTIGEN: CPT | Performed by: FAMILY MEDICINE

## 2023-01-09 PROCEDURE — 86803 HEPATITIS C AB TEST: CPT | Performed by: FAMILY MEDICINE

## 2023-01-09 PROCEDURE — 99214 OFFICE O/P EST MOD 30 MIN: CPT | Mod: 25 | Performed by: FAMILY MEDICINE

## 2023-01-09 PROCEDURE — 36415 COLL VENOUS BLD VENIPUNCTURE: CPT | Performed by: FAMILY MEDICINE

## 2023-01-09 PROCEDURE — 80061 LIPID PANEL: CPT | Performed by: FAMILY MEDICINE

## 2023-01-09 PROCEDURE — 99396 PREV VISIT EST AGE 40-64: CPT | Performed by: FAMILY MEDICINE

## 2023-01-09 PROCEDURE — 87389 HIV-1 AG W/HIV-1&-2 AB AG IA: CPT | Performed by: FAMILY MEDICINE

## 2023-01-09 PROCEDURE — 85027 COMPLETE CBC AUTOMATED: CPT | Performed by: FAMILY MEDICINE

## 2023-01-09 PROCEDURE — 80053 COMPREHEN METABOLIC PANEL: CPT | Performed by: FAMILY MEDICINE

## 2023-01-09 RX ORDER — LEVOTHYROXINE SODIUM 137 UG/1
TABLET ORAL
Qty: 90 TABLET | Refills: 3 | Status: SHIPPED | OUTPATIENT
Start: 2023-01-09 | End: 2023-07-28

## 2023-01-09 ASSESSMENT — ENCOUNTER SYMPTOMS
HEMATOCHEZIA: 0
CONSTIPATION: 0
JOINT SWELLING: 0
PARESTHESIAS: 0
DIZZINESS: 0
FREQUENCY: 0
NAUSEA: 0
WEAKNESS: 0
SORE THROAT: 0
ARTHRALGIAS: 0
FEVER: 0
HEARTBURN: 0
DYSURIA: 0
DIARRHEA: 0
CHILLS: 0
COUGH: 0
SHORTNESS OF BREATH: 0
PALPITATIONS: 0
EYE PAIN: 0
BREAST MASS: 0
MYALGIAS: 0
NERVOUS/ANXIOUS: 1
HEMATURIA: 0
HEADACHES: 0
ABDOMINAL PAIN: 0

## 2023-01-09 NOTE — PROGRESS NOTES
SUBJECTIVE:   CC: Viviane is an 57 year old who presents for preventive health visit.     Patient has been advised of split billing requirements and indicates understanding: Yes  Healthy Habits:     Getting at least 3 servings of Calcium per day:  Yes    Bi-annual eye exam:  Yes    Dental care twice a year:  NO    Sleep apnea or symptoms of sleep apnea:  None    Diet:  Regular (no restrictions)    Frequency of exercise:  6-7 days/week    Duration of exercise:  45-60 minutes    Taking medications regularly:  Yes    Medication side effects:  Not applicable    PHQ-2 Total Score: 0    Additional concerns today:  Yes          Hypothyroidism Follow-up      Since last visit, patient describes the following symptoms: Weight stable, no hair loss, no skin changes, no constipation, no loose stools      Today's PHQ-2 Score:   PHQ-2 (  [employer]) 2023   Q1: Little interest or pleasure in doing things 0   Q2: Feeling down, depressed or hopeless 0   PHQ-2 Score 0   PHQ-2 Total Score (12-17 Years)- Positive if 3 or more points; Administer PHQ-A if positive -   Q1: Little interest or pleasure in doing things Not at all   Q2: Feeling down, depressed or hopeless Not at all   PHQ-2 Score 0           Social History     Tobacco Use     Smoking status: Former     Types: Cigarettes     Quit date: 2004     Years since quittin.4     Smokeless tobacco: Never   Substance Use Topics     Alcohol use: Yes     Comment: Alcoholic Drinks/day: occassional     If you drink alcohol do you typically have >3 drinks per day or >7 drinks per week? No    Alcohol Use 2023   Prescreen: >3 drinks/day or >7 drinks/week? No   Prescreen: >3 drinks/day or >7 drinks/week? -       Reviewed orders with patient.  Reviewed health maintenance and updated orders accordingly - Yes  Lab work is in process  Labs reviewed in EPIC    Breast Cancer Screening:    FHS-7:   Breast CA Risk Assessment (FHS-7) 10/12/2021 2022 2023   Did any of your  first-degree relatives have breast or ovarian cancer? Yes No No   Did any of your relatives have bilateral breast cancer? No No No   Did any man in your family have breast cancer? No No No   Did any woman in your family have breast and ovarian cancer? No No No   Did any woman in your family have breast cancer before age 50 y? Yes Yes Yes   Do you have 2 or more relatives with breast and/or ovarian cancer? No Yes Yes   Do you have 2 or more relatives with breast and/or bowel cancer? No Yes No       Mammogram Screening: Recommended mammography every 1-2 years with patient discussion and risk factor consideration  Pertinent mammograms are reviewed under the imaging tab.    History of abnormal Pap smear: Status post benign hysterectomy. Health Maintenance and Surgical History updated.     Reviewed and updated as needed this visit by clinical staff   Tobacco  Allergies  Meds              Reviewed and updated as needed this visit by Provider                 Past Medical History:   Diagnosis Date     Colon cancer (H) 07/05/2012    Stage IIA adenocarcinoma colon  Dr Gabino Rainey [place] ONC     Disease of thyroid gland     Uninodular goiter      Past Surgical History:   Procedure Laterality Date     CONIZATION CERVIX,KNIFE/LASER      Description: Cervical Conization By Laser;  Proc Date: 08/10/1995;  Comments: Dr. Bartolome Vivas at [facility]     CYSTOURETHROSCOPY      Description: Cystoscopy (Diagnostic);  Recorded: 01/03/2012;  Comments: recurrent UTI as a child     HYSTERECTOMY  2011     OTHER SURGICAL HISTORY  08/16/2013    colonoscopy1 year, 3 years, every 5 years     THYROIDECTOMY, PARTIAL N/A 8/6/2014      Surgeon: Paddy Snow MD;  Uninodular goiter     [facility] PART REMOVAL COLON W ANASTOMOSIS  2012    Partial Colectomy - Sigmoid;  Recorded: 07/09/2012;  Comments: 6/13/12 Dr Rainey     [facility] TOTAL ABDOM HYSTERECTOMY  2011    Description: Hysterectomy;  Recorded: 06/07/2012;  Comments: 1/4/2012  FIBROID UTERUS, AND  "FALLOPIAN TUBE  Ovaries remain; ; Robotic hysterectomy       Review of Systems   Constitutional: Negative for chills and fever.   HENT: Negative for congestion, ear pain, hearing loss and sore throat.    Eyes: Negative for pain and visual disturbance.   Respiratory: Negative for cough and shortness of breath.    Cardiovascular: Negative for chest pain, palpitations and peripheral edema.   Gastrointestinal: Negative for abdominal pain, constipation, diarrhea, heartburn, hematochezia and nausea.   Breasts:  Negative for tenderness, breast mass and discharge.   Genitourinary: Negative for dysuria, frequency, genital sores, hematuria, pelvic pain, urgency, vaginal bleeding and vaginal discharge.   Musculoskeletal: Negative for arthralgias, joint swelling and myalgias.   Skin: Negative for rash.   Neurological: Negative for dizziness, weakness, headaches and paresthesias.   Psychiatric/Behavioral: Negative for mood changes. The patient is nervous/anxious.           OBJECTIVE:   /84 (BP Location: Right arm, Patient Position: Sitting, Cuff Size: Adult Large)   Pulse 70   Resp 14   Ht 1.651 m (5' 5\")   Wt 115.6 kg (254 lb 14.4 oz)   SpO2 97%   BMI 42.42 kg/m    Physical Exam  GENERAL APPEARANCE: healthy, alert and no distress  EYES: Eyes grossly normal to inspection, PERRL and conjunctivae and sclerae normal  HENT: ear canals and TM's normal, nose and mouth without ulcers or lesions, oropharynx clear and oral mucous membranes moist  NECK: no adenopathy, no asymmetry, masses, or scars and thyroid normal to palpation  RESP: lungs clear to auscultation - no rales, rhonchi or wheezes  BREAST: normal without masses, tenderness or nipple discharge and no palpable axillary masses or adenopathy  CV: regular rate and rhythm, normal S1 S2, no S3 or S4, no murmur, click or rub, no peripheral edema and peripheral pulses strong  ABDOMEN: soft, nontender, no hepatosplenomegaly, no masses and bowel sounds normal  MS: no " musculoskeletal defects are noted and gait is age appropriate without ataxia  SKIN: no rashes. A lump on forehead, no swelling and tenderness. 2 cm.   NEURO: Normal strength and tone, sensory exam grossly normal, mentation intact and speech normal  PSYCH: mentation appears normal and affect normal/bright    Diagnostic Test Results:  Labs reviewed in Epic    ASSESSMENT/PLAN:   (Z00.00) Routine general medical examination at a health care facility  (primary encounter diagnosis)  Comment: encourage annual pe and vaccine up date  Plan: Comprehensive metabolic panel (BMP + Alb, Alk         Phos, ALT, AST, Total. Bili, TP), Lipid panel         reflex to direct LDL Non-fasting, CBC with         platelets, HIV Antigen Antibody Combo,         Hepatitis C Screen Reflex to HCV RNA Quant and         Genotype        Pt will ask insurance for shingle and hep b vaccine coverage. Declined flu vaccine.     (C18.9) Malignant neoplasm of colon, unspecified part of colon (H)  Comment: pt had colon ca 10 years ago, she had surgery. She follow-up with surgeon for colonoscope that is up date. Last  one was  at 2021. She state she was told by her gi/surgeon to have cea yearly.   Plan: CEA        Advised Continue follow-up with gi/surgeon.     (E66.01) Morbid obesity (H)  Comment: bmi 42 and failed to improve.   Plan: strongly advise exercise and health diet to loss weight.     (E03.9) Hypothyroidism, unspecified type  Comment: doing well with medication, stable.   Plan: TSH with free T4 reflex, levothyroxine         (SYNTHROID/LEVOTHROID) 137 MCG tablet        Continue current medication.     (L72.0) Cyst of skin and subcutaneous tissue  Comment: pt has a lump on forehead for years. Slowly grow. No pain. She likes to remove it. Exam: a cyst lump on forehead, no swelling and tenderness. 2 cm   Plan: Adult General Surg Referral        Will have surgeon consult.       Patient has been advised of split billing requirements and indicates  "understanding: Yes      COUNSELING:  Reviewed preventive health counseling, as reflected in patient instructions       Regular exercise       Healthy diet/nutrition       Vision screening       Hearing screening       Osteoporosis prevention/bone health       Colorectal Cancer Screening       Advance Care Planning      BMI:   Estimated body mass index is 42.42 kg/m  as calculated from the following:    Height as of this encounter: 1.651 m (5' 5\").    Weight as of this encounter: 115.6 kg (254 lb 14.4 oz).   Weight management plan: Discussed healthy diet and exercise guidelines      She reports that she quit smoking about 18 years ago. She has never used smokeless tobacco.          Roberta Washington MD  [facility]  "

## 2023-01-15 ENCOUNTER — MYC MEDICAL ADVICE (OUTPATIENT)
Dept: FAMILY MEDICINE | Facility: CLINIC | Age: 58
End: 2023-01-15

## 2023-01-23 DIAGNOSIS — E03.9 HYPOTHYROIDISM, UNSPECIFIED TYPE: ICD-10-CM

## 2023-01-23 NOTE — TELEPHONE ENCOUNTER
Chief Complaint   Patient presents with     Refill Request     Levothyroxine 0.137 mg ( 137 mcg)         Haleigh Rios RN on 1/23/2023 at 12:32 PM

## 2023-01-24 RX ORDER — LEVOTHYROXINE SODIUM 137 UG/1
TABLET ORAL
Qty: 90 TABLET | Refills: 3 | OUTPATIENT
Start: 2023-01-24

## 2023-03-16 ENCOUNTER — OFFICE VISIT (OUTPATIENT)
Dept: PLASTIC SURGERY | Facility: AMBULATORY SURGERY CENTER | Age: 58
End: 2023-03-16
Attending: FAMILY MEDICINE
Payer: COMMERCIAL

## 2023-03-16 VITALS
BODY MASS INDEX: 39.99 KG/M2 | HEIGHT: 65 IN | HEART RATE: 70 BPM | DIASTOLIC BLOOD PRESSURE: 80 MMHG | WEIGHT: 240 LBS | SYSTOLIC BLOOD PRESSURE: 120 MMHG

## 2023-03-16 DIAGNOSIS — L72.0 CYST OF SKIN AND SUBCUTANEOUS TISSUE: Primary | ICD-10-CM

## 2023-03-16 PROCEDURE — 99203 OFFICE O/P NEW LOW 30 MIN: CPT | Performed by: PLASTIC SURGERY

## 2023-03-16 NOTE — LETTER
3/16/2023         RE: Charo Leung  [address] [facility] [address]        Dear Colleague,    Thank you for referring your patient, Charo Leung, to the [facility] [place]. Please see a copy of my visit note below.    Chief complaint:  Soft tissue mass mid forehead    History of present illness:  This is a 58 year old lady who presents with a soft tissue mass on her mid forehead.  This lesion has been present for the last 5 years and has been slowly growing in size.  Primary care physician has referred the patient to me for possible excision. Patient denies pain in the area.    Past medical history:  Past Medical History:   Diagnosis Date     Colon cancer (H) 07/05/2012    Stage IIA adenocarcinoma colon  Dr Gabino [place] ONC     Disease of thyroid gland     Uninodular goiter     Hypothyroidism, sigmoid cancer.    Past surgical history:  Sigmoid colectomy, partial thyroidectomy for suspicious thyroid nodules that turned out to be benign, total abdominal hysterectomy with unilateral salpingo-oophorectomy.    Allergies:  Penicillin    Medications:    Current Outpatient Medications:      DOCOSAHEXANOIC ACID/EPA (FISH OIL ORAL), [DOCOSAHEXANOIC ACID/EPA (FISH OIL ORAL)] Take 300 mg by mouth daily., Disp: , Rfl:      lactobacillus rhamnosus, GG, (CULTURELL) capsule, , Disp: , Rfl:      levothyroxine (SYNTHROID/LEVOTHROID) 137 MCG tablet, [LEVOTHYROXINE (SYNTHROID, LEVOTHROID) 137 MCG TABLET] TAKE 1 TABLET(137 MCG) BY MOUTH DAILY, Disp: 90 tablet, Rfl: 3    Family history:  Mother with multiple myeloma and father with esophageal cancer    Social History:  Denies tobacco, drinks alcohol socially.    Review of systems:  General ROS: No complaints or constitutional symptoms  Skin: No complaints or symptoms   Hematologic/Lymphatic: No symptoms or complaints  Psychiatric: No symptoms or complaints  Endocrine: No excessive fatigue, no hypermetabolic symptoms  "reported  Respiratory ROS: No cough, shortness of breath, or wheezing  Cardiovascular ROS: No chest pain or dyspnea on exertion  Breast ROS: Denies palpable breast masses, denies nipple discharge, denies peau d'orange  Gastrointestinal ROS: No abdominal pain, nausea, diarrhea, or constipation  Musculoskeletal ROS: No recent injuries reported  Neurological ROS: No focal neurologic defects reported.      Physical exam:  /80 (BP Location: Right arm, Patient Position: Sitting)   Pulse 70   Ht 1.651 m (5' 5\")   Wt 108.9 kg (240 lb)   BMI 39.94 kg/m    General: Alert, cooperative, appears stated age   Skin: Skin color, texture, turgor normal, no rashes or lesions   Lymphatic: No obvious adenopathy, no swelling   Eyes: No scleral icterus, pupils equal  HENT: There is a 2.5 cm x 2 cm soft tissue mass in the mid forehead.  Is mobile, it resembles a lipoma although differential diagnosis could be epidermal inclusion cyst or a bony lesion from the underlying frontal bone, which is less likely.  Lungs: Normal respiratory effort, breath sounds equal bilaterally  Heart: Regular rate and rhythm  Breasts: Not examined  Abdomen: Soft, non-distended and non-tender to palpation  Neurologic: Grossly intact                  ASSESSMENT:    This is a 58 year old lady with soft tissue mass in the mid forehead, rule out lipoma.     PLAN:   Patient will benefit of excision of this soft tissue mass in the operating room.    Risks were explained to the patient and they include but are not limited to scarring, infection, dehiscence, hematoma.  Patient has acknowledged all the risks and agreed to proceed.    Time spent with the patient 30 minutes.      Gal Orellana MD, FACS   Diplomate American Board of Plastic Surgery  Diplomate American Board of Surgery  [facility]  Division of Plastic & Reconstructive Surgery  Office: [phone]   3/16/2023 at 10:01 AM        Again, thank you for allowing me to " participate in the care of your patient.        Sincerely,        Gal Orellana MD

## 2023-03-16 NOTE — NURSING NOTE
The patient is here today for cyst removal consult. Patient reports cyst developed 5 years ago. The patient reports it has had some slight growth over the 5 years. The patient reports pain with pressure to the area but otherwise it does not cause any issues.     Maribel Franklin RN on 3/16/2023 at 9:35 AM

## 2023-03-16 NOTE — PROGRESS NOTES
Chief complaint:  Soft tissue mass mid forehead    History of present illness:  This is a 58 year old lady who presents with a soft tissue mass on her mid forehead.  This lesion has been present for the last 5 years and has been slowly growing in size.  Primary care physician has referred the patient to me for possible excision. Patient denies pain in the area.    Past medical history:  Past Medical History:   Diagnosis Date     Colon cancer (H) 07/05/2012    Stage IIA adenocarcinoma colon  Dr Gabino [facility]ONTAG[place] ONC     Disease of thyroid gland     Uninodular goiter     Hypothyroidism, sigmoid cancer.    Past surgical history:  Sigmoid colectomy, partial thyroidectomy for suspicious thyroid nodules that turned out to be benign, total abdominal hysterectomy with unilateral salpingo-oophorectomy.    Allergies:  Penicillin    Medications:    Current Outpatient Medications:      DOCOSAHEXANOIC ACID/EPA (FISH OIL ORAL), [DOCOSAHEXANOIC ACID/EPA (FISH OIL ORAL)] Take 300 mg by mouth daily., Disp: , Rfl:      lactobacillus rhamnosus, GG, (CULTURELL) capsule, , Disp: , Rfl:      levothyroxine (SYNTHROID/LEVOTHROID) 137 MCG tablet, [LEVOTHYROXINE (SYNTHROID, LEVOTHROID) 137 MCG TABLET] TAKE 1 TABLET(137 MCG) BY MOUTH DAILY, Disp: 90 tablet, Rfl: 3    Family history:  Mother with multiple myeloma and father with esophageal cancer    Social History:  Denies tobacco, drinks alcohol socially.    Review of systems:  General ROS: No complaints or constitutional symptoms  Skin: No complaints or symptoms   Hematologic/Lymphatic: No symptoms or complaints  Psychiatric: No symptoms or complaints  Endocrine: No excessive fatigue, no hypermetabolic symptoms reported  Respiratory ROS: No cough, shortness of breath, or wheezing  Cardiovascular ROS: No chest pain or dyspnea on exertion  Breast ROS: Denies palpable breast masses, denies nipple discharge, denies peau d'orange  Gastrointestinal ROS: No abdominal pain, nausea, diarrhea, or  "constipation  Musculoskeletal ROS: No recent injuries reported  Neurological ROS: No focal neurologic defects reported.      Physical exam:  /80 (BP Location: Right arm, Patient Position: Sitting)   Pulse 70   Ht 1.651 m (5' 5\")   Wt 108.9 kg (240 lb)   BMI 39.94 kg/m    General: Alert, cooperative, appears stated age   Skin: Skin color, texture, turgor normal, no rashes or lesions   Lymphatic: No obvious adenopathy, no swelling   Eyes: No scleral icterus, pupils equal  HENT: There is a 2.5 cm x 2 cm soft tissue mass in the mid forehead.  Is mobile, it resembles a lipoma although differential diagnosis could be epidermal inclusion cyst or a bony lesion from the underlying frontal bone, which is less likely.  Lungs: Normal respiratory effort, breath sounds equal bilaterally  Heart: Regular rate and rhythm  Breasts: Not examined  Abdomen: Soft, non-distended and non-tender to palpation  Neurologic: Grossly intact                  ASSESSMENT:    This is a 58 year old lady with soft tissue mass in the mid forehead, rule out lipoma.     PLAN:   Patient will benefit of excision of this soft tissue mass in the operating room.    Risks were explained to the patient and they include but are not limited to scarring, infection, dehiscence, hematoma.  Patient has acknowledged all the risks and agreed to proceed.    Time spent with the patient 30 minutes.      Gal Orellana MD, FACS   Diplomate American Board of Plastic Surgery  Diplomate American Board of Surgery  [facility]  Division of Plastic & Reconstructive Surgery  Office: [phone]   3/16/2023 at 10:01 AM    "

## 2023-04-13 ENCOUNTER — TELEPHONE (OUTPATIENT)
Dept: PLASTIC SURGERY | Facility: AMBULATORY SURGERY CENTER | Age: 58
End: 2023-04-13
Payer: COMMERCIAL

## 2023-04-13 PROBLEM — L72.0 CYST OF SKIN AND SUBCUTANEOUS TISSUE: Status: ACTIVE | Noted: 2023-04-13

## 2023-04-13 NOTE — TELEPHONE ENCOUNTER
Spoke with patient today regarding surgery scheduling     Went over details/instructions.    Surgery Letter sent via [employer]  (Please see LETTERS TAB in chart to retrieve a copy of this letter)

## 2023-04-13 NOTE — LETTER
We've received instruction to get you scheduled for surgery with Dr. Orellana. We have that arranged as follows:     Pre-op Physical:  Call your primary clinic to schedule.    Surgery Date: 5/3/2023     [facility]    [address] [place] [address], [address], [place] [address]    Approximate Arrival Time: 9:30 am  (Unless instructed differently by the pre-op call nurse)     1st Post op Appointment: 5/10/2023 at 10:45 am  at   [facility],   [address] [place] [address] [address], [place] [address].    2nd Post op Appointment: 6/2/2023 at 10:30 am at   [facility],   [address] [place] [address] [address], [place] [address].    Prep Tasks and Info:     A pre-op physical with your primary care doctor is required before surgery. This must be 10-30 days before surgery.  Since it required by anesthesia, your surgery will be cancelled if it's not done. Call your clinic asap to get this scheduled.    Review your medications with your primary care or prescribing physician; they will advise you which meds to stop and when, and when you can resume taking.  Certain medications like blood thinners need to be stopped in advance of surgery to proceed safely.    Please shower the evening before and morning of surgery with Hibiclens or Exidine soap.  This can be found at your local pharmacy. Follow the links below for detailed instructions on preoperative skin preparation:      Fasting instructions will be provided by the pre-op nurse who will call you 1-3 days before surgery.  Typically we advise normal food up to 8 hours before you arrive for surgery. Clear liquids only from then until 2 hours before you arrive surgery then nothing at all by mouth.  The nurse will review your specific instructions with you at the call.      Smoking impacts your body's ability to heal properly so we advise patients to quit  if possible before surgery.  Plastic Surgery patients are required to be nicotine free for at least 8 weeks before surgery.      You will need an adult to drive you home and stay with you 24 hours after surgery. Public transportation or Medical Van Services are not permitted.    Visitor restrictions are subject to change, please verify with the pre-op nurse when they call how many people are permitted to accompany you.    We always encourage you to notify your insurance any time you have medical tests or procedures scheduled including surgery. The number is usually right on the back of your insurance card. Please call [facility] Cost of Care at [phone] if you'd like a surgery quote.        Call our office if you have any questions! Thank you!

## 2023-04-14 ENCOUNTER — TELEPHONE (OUTPATIENT)
Dept: PLASTIC SURGERY | Facility: AMBULATORY SURGERY CENTER | Age: 58
End: 2023-04-14
Payer: COMMERCIAL

## 2023-04-14 NOTE — TELEPHONE ENCOUNTER
Patient called in requesting to change her surgery date from 5/3 because she was not able to get in for a pre op before then. We have moved her case to 5/24 at [facility].    Offered to try scheduling her pre op at another clinic instead of rescheduling surgery but patient preferred to be seen by her pcp only.     [facility] notified

## 2023-05-10 ENCOUNTER — OFFICE VISIT (OUTPATIENT)
Dept: FAMILY MEDICINE | Facility: CLINIC | Age: 58
End: 2023-05-10
Payer: COMMERCIAL

## 2023-05-10 VITALS
OXYGEN SATURATION: 96 % | WEIGHT: 259 LBS | DIASTOLIC BLOOD PRESSURE: 84 MMHG | HEIGHT: 65 IN | RESPIRATION RATE: 16 BRPM | HEART RATE: 79 BPM | SYSTOLIC BLOOD PRESSURE: 128 MMHG | BODY MASS INDEX: 43.15 KG/M2 | TEMPERATURE: 98.6 F

## 2023-05-10 DIAGNOSIS — L72.3 SEBACEOUS CYST: ICD-10-CM

## 2023-05-10 DIAGNOSIS — Z01.818 PREOP GENERAL PHYSICAL EXAM: Primary | ICD-10-CM

## 2023-05-10 PROCEDURE — 99214 OFFICE O/P EST MOD 30 MIN: CPT | Performed by: FAMILY MEDICINE
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
## 2023-05-10 NOTE — PATIENT INSTRUCTIONS
For informational purposes only. Not to replace the advice of your health care provider. Copyright   2003,  [facility]. All rights reserved. Clinically reviewed by Shawna Aquino MD. Zubka [id number] - REV .  Preparing for Your Surgery  Getting started  A nurse will call you to review your health history and instructions. They will give you an arrival time based on your scheduled surgery time. Please be ready to share:  Your doctor's clinic name and phone number  Your medical, surgical, and anesthesia history  A list of allergies and sensitivities  A list of medicines, including herbal treatments and over-the-counter drugs  Whether the patient has a legal guardian (ask how to send us the papers in advance)  Please tell us if you're pregnant--or if there's any chance you might be pregnant. Some surgeries may injure a fetus (unborn baby), so they require a pregnancy test. Surgeries that are safe for a fetus don't always need a test, and you can choose whether to have one.   If you have a child who's having surgery, please ask for a copy of Preparing for Your Child's Surgery.    Preparing for surgery  Within 10 to 30 days of surgery: Have a pre-op exam (sometimes called an H&P, or History and Physical). This can be done at a clinic or pre-operative center.  If you're having a , you may not need this exam. Talk to your care team.  At your pre-op exam, talk to your care team about all medicines you take. If you need to stop any medicines before surgery, ask when to start taking them again.  We do this for your safety. Many medicines can make you bleed too much during surgery. Some change how well surgery (anesthesia) drugs work.  Call your insurance company to let them know you're having surgery. (If you don't have insurance, call [phone].)  Call your clinic if there's any change in your health. This includes signs of a cold or flu (sore throat, runny nose, cough, rash, fever). It  also includes a scrape or scratch near the surgery site.  If you have questions on the day of surgery, call your hospital or surgery center.  Eating and drinking guidelines  For your safety: Unless your surgeon tells you otherwise, follow the guidelines below.  Eat and drink as usual until 8 hours before you arrive for surgery. After that, no food or milk.  Drink clear liquids until 2 hours before you arrive. These are liquids you can see through, like water, Gatorade, and Propel Water. They also include plain black coffee and tea (no cream or milk), candy, and breath mints. You can spit out gum when you arrive.  If you drink alcohol: Stop drinking it the night before surgery.  If your care team tells you to take medicine on the morning of surgery, it's okay to take it with a sip of water.  Preventing infection  Shower or bathe the night before and morning of your surgery. Follow the instructions your clinic gave you. (If no instructions, use regular soap.)  Don't shave or clip hair near your surgery site. We'll remove the hair if needed.  Don't smoke or vape the morning of surgery. You may chew nicotine gum up to 2 hours before surgery. A nicotine patch is okay.  Note: Some surgeries require you to completely quit smoking and nicotine. Check with your surgeon.  Your care team will make every effort to keep you safe from infection. We will:  Clean our hands often with soap and water (or an alcohol-based hand rub).  Clean the skin at your surgery site with a special soap that kills germs.  Give you a special gown to keep you warm. (Cold raises the risk of infection.)  Wear special hair covers, masks, gowns and gloves during surgery.  Give antibiotic medicine, if prescribed. Not all surgeries need antibiotics.  What to bring on the day of surgery  Photo ID and insurance card  Copy of your health care directive, if you have one  Glasses and hearing aids (bring cases)  You can't wear contacts during surgery  Inhaler and  eye drops, if you use them (tell us about these when you arrive)  CPAP machine or breathing device, if you use them  A few personal items, if spending the night  If you have . . .  A pacemaker, ICD (cardiac defibrillator) or other implant: Bring the ID card.  An implanted stimulator: Bring the remote control.  A legal guardian: Bring a copy of the certified (court-stamped) guardianship papers.  Please remove any jewelry, including body piercings. Leave jewelry and other valuables at home.  If you're going home the day of surgery  You must have a responsible adult drive you home. They should stay with you overnight as well.  If you don't have someone to stay with you, and you aren't safe to go home alone, we may keep you overnight. Insurance often won't pay for this.  After surgery  If it's hard to control your pain or you need more pain medicine, please call your surgeon's office.  Questions?   If you have any questions for your care team, list them here: _________________________________________________________________________________________________________________________________________________________________________ ____________________________________ ____________________________________ ____________________________________    How to Take Your Medication Before Surgery  - Take all of your medications before surgery as usual  - STOP taking all vitamins and herbal supplements 14 days before surgery.

## 2023-05-23 ENCOUNTER — ANESTHESIA EVENT (OUTPATIENT)
Dept: SURGERY | Facility: AMBULATORY SURGERY CENTER | Age: 58
End: 2023-05-23
Payer: COMMERCIAL

## 2023-05-24 ENCOUNTER — HOSPITAL ENCOUNTER (OUTPATIENT)
Facility: AMBULATORY SURGERY CENTER | Age: 58
Discharge: HOME | End: 2023-05-24
Attending: PLASTIC SURGERY | Admitting: PLASTIC SURGERY
Payer: COMMERCIAL

## 2023-05-24 ENCOUNTER — ANESTHESIA (OUTPATIENT)
Dept: SURGERY | Facility: AMBULATORY SURGERY CENTER | Age: 58
End: 2023-05-24
Payer: COMMERCIAL

## 2023-05-24 VITALS
HEIGHT: 65 IN | OXYGEN SATURATION: 96 % | WEIGHT: 259 LBS | HEART RATE: 51 BPM | SYSTOLIC BLOOD PRESSURE: 116 MMHG | BODY MASS INDEX: 43.15 KG/M2 | RESPIRATION RATE: 12 BRPM | TEMPERATURE: 97.5 F | DIASTOLIC BLOOD PRESSURE: 63 MMHG

## 2023-05-24 DIAGNOSIS — L72.0 CYST OF SKIN AND SUBCUTANEOUS TISSUE: Primary | ICD-10-CM

## 2023-05-24 PROCEDURE — 21012 EXC FACE LES SBQ 2 CM/>: CPT

## 2023-05-24 PROCEDURE — 88304 TISSUE EXAM BY PATHOLOGIST: CPT | Mod: TC | Performed by: PLASTIC SURGERY

## 2023-05-24 PROCEDURE — 21012 EXC FACE LES SBQ 2 CM/>: CPT | Performed by: PLASTIC SURGERY

## 2023-05-24 PROCEDURE — 88304 TISSUE EXAM BY PATHOLOGIST: CPT | Mod: 26 | Performed by: DERMATOLOGY

## 2023-05-24 RX ORDER — OXYCODONE HYDROCHLORIDE 5 MG/1
5 TABLET ORAL
Status: DISCONTINUED | OUTPATIENT
Start: 2023-05-24 | End: 2023-05-25 | Stop reason: HOSPADM

## 2023-05-24 RX ORDER — ONDANSETRON 4 MG/1
4 TABLET, ORALLY DISINTEGRATING ORAL EVERY 30 MIN PRN
Status: DISCONTINUED | OUTPATIENT
Start: 2023-05-24 | End: 2023-05-25 | Stop reason: HOSPADM

## 2023-05-24 RX ORDER — LIDOCAINE HYDROCHLORIDE AND EPINEPHRINE 10; 10 MG/ML; UG/ML
INJECTION, SOLUTION INFILTRATION; PERINEURAL PRN
Status: DISCONTINUED | OUTPATIENT
Start: 2023-05-24 | End: 2023-05-24 | Stop reason: HOSPADM

## 2023-05-24 RX ORDER — ONDANSETRON 2 MG/ML
4 INJECTION INTRAMUSCULAR; INTRAVENOUS EVERY 30 MIN PRN
Status: DISCONTINUED | OUTPATIENT
Start: 2023-05-24 | End: 2023-05-25 | Stop reason: HOSPADM

## 2023-05-24 RX ORDER — PROPOFOL 10 MG/ML
INJECTION, EMULSION INTRAVENOUS CONTINUOUS PRN
Status: DISCONTINUED | OUTPATIENT
Start: 2023-05-24 | End: 2023-05-24

## 2023-05-24 RX ORDER — GLYCOPYRROLATE 0.2 MG/ML
INJECTION, SOLUTION INTRAMUSCULAR; INTRAVENOUS PRN
Status: DISCONTINUED | OUTPATIENT
Start: 2023-05-24 | End: 2023-05-24

## 2023-05-24 RX ORDER — CLINDAMYCIN PHOSPHATE 600 MG/50ML
600 INJECTION, SOLUTION INTRAVENOUS EVERY 8 HOURS
Status: DISCONTINUED | OUTPATIENT
Start: 2023-05-24 | End: 2023-05-24 | Stop reason: DRUGHIGH

## 2023-05-24 RX ORDER — HYDROMORPHONE HYDROCHLORIDE 1 MG/ML
0.2 INJECTION, SOLUTION INTRAMUSCULAR; INTRAVENOUS; SUBCUTANEOUS EVERY 5 MIN PRN
Status: DISCONTINUED | OUTPATIENT
Start: 2023-05-24 | End: 2023-05-25 | Stop reason: HOSPADM

## 2023-05-24 RX ORDER — DEXAMETHASONE SODIUM PHOSPHATE 4 MG/ML
INJECTION, SOLUTION INTRA-ARTICULAR; INTRALESIONAL; INTRAMUSCULAR; INTRAVENOUS; SOFT TISSUE PRN
Status: DISCONTINUED | OUTPATIENT
Start: 2023-05-24 | End: 2023-05-24

## 2023-05-24 RX ORDER — FENTANYL CITRATE 50 UG/ML
50 INJECTION, SOLUTION INTRAMUSCULAR; INTRAVENOUS EVERY 5 MIN PRN
Status: DISCONTINUED | OUTPATIENT
Start: 2023-05-24 | End: 2023-05-25 | Stop reason: HOSPADM

## 2023-05-24 RX ORDER — FENTANYL CITRATE 50 UG/ML
25 INJECTION, SOLUTION INTRAMUSCULAR; INTRAVENOUS EVERY 5 MIN PRN
Status: DISCONTINUED | OUTPATIENT
Start: 2023-05-24 | End: 2023-05-25 | Stop reason: HOSPADM

## 2023-05-24 RX ORDER — SODIUM CHLORIDE, SODIUM LACTATE, POTASSIUM CHLORIDE, CALCIUM CHLORIDE 600; 310; 30; 20 MG/100ML; MG/100ML; MG/100ML; MG/100ML
INJECTION, SOLUTION INTRAVENOUS CONTINUOUS
Status: DISCONTINUED | OUTPATIENT
Start: 2023-05-24 | End: 2023-05-25 | Stop reason: HOSPADM

## 2023-05-24 RX ORDER — LIDOCAINE 40 MG/G
CREAM TOPICAL
Status: DISCONTINUED | OUTPATIENT
Start: 2023-05-24 | End: 2023-05-24 | Stop reason: HOSPADM

## 2023-05-24 RX ORDER — CLINDAMYCIN PHOSPHATE 900 MG/50ML
900 INJECTION, SOLUTION INTRAVENOUS ONCE
Status: DISCONTINUED | OUTPATIENT
Start: 2023-05-24 | End: 2023-05-25 | Stop reason: HOSPADM

## 2023-05-24 RX ORDER — IBUPROFEN 800 MG/1
800 TABLET, FILM COATED ORAL EVERY 6 HOURS PRN
Qty: 12 TABLET | Refills: 0 | Status: SHIPPED | OUTPATIENT
Start: 2023-05-24 | End: 2023-05-27

## 2023-05-24 RX ORDER — OXYCODONE HYDROCHLORIDE 5 MG/1
10 TABLET ORAL
Status: DISCONTINUED | OUTPATIENT
Start: 2023-05-24 | End: 2023-05-25 | Stop reason: HOSPADM

## 2023-05-24 RX ORDER — LIDOCAINE HYDROCHLORIDE 20 MG/ML
INJECTION, SOLUTION INFILTRATION; PERINEURAL PRN
Status: DISCONTINUED | OUTPATIENT
Start: 2023-05-24 | End: 2023-05-24

## 2023-05-24 RX ORDER — HYDROMORPHONE HYDROCHLORIDE 1 MG/ML
0.4 INJECTION, SOLUTION INTRAMUSCULAR; INTRAVENOUS; SUBCUTANEOUS EVERY 5 MIN PRN
Status: DISCONTINUED | OUTPATIENT
Start: 2023-05-24 | End: 2023-05-25 | Stop reason: HOSPADM

## 2023-05-24 RX ORDER — SODIUM CHLORIDE, SODIUM LACTATE, POTASSIUM CHLORIDE, CALCIUM CHLORIDE 600; 310; 30; 20 MG/100ML; MG/100ML; MG/100ML; MG/100ML
INJECTION, SOLUTION INTRAVENOUS CONTINUOUS
Status: DISCONTINUED | OUTPATIENT
Start: 2023-05-24 | End: 2023-05-24 | Stop reason: HOSPADM

## 2023-05-24 RX ORDER — FENTANYL CITRATE 50 UG/ML
INJECTION, SOLUTION INTRAMUSCULAR; INTRAVENOUS PRN
Status: DISCONTINUED | OUTPATIENT
Start: 2023-05-24 | End: 2023-05-24

## 2023-05-24 RX ORDER — PROPOFOL 10 MG/ML
INJECTION, EMULSION INTRAVENOUS PRN
Status: DISCONTINUED | OUTPATIENT
Start: 2023-05-24 | End: 2023-05-24

## 2023-05-24 RX ORDER — ACETAMINOPHEN 325 MG/1
975 TABLET ORAL ONCE
Status: COMPLETED | OUTPATIENT
Start: 2023-05-24 | End: 2023-05-24

## 2023-05-24 RX ORDER — CLINDAMYCIN HCL 300 MG
300 CAPSULE ORAL 3 TIMES DAILY
Qty: 3 CAPSULE | Refills: 0 | Status: SHIPPED | OUTPATIENT
Start: 2023-05-24 | End: 2023-05-25

## 2023-05-24 RX ADMIN — DEXAMETHASONE SODIUM PHOSPHATE 4 MG: 4 INJECTION, SOLUTION INTRA-ARTICULAR; INTRALESIONAL; INTRAMUSCULAR; INTRAVENOUS; SOFT TISSUE at 10:39

## 2023-05-24 RX ADMIN — FENTANYL CITRATE 50 MCG: 50 INJECTION, SOLUTION INTRAMUSCULAR; INTRAVENOUS at 10:30

## 2023-05-24 RX ADMIN — PROPOFOL 150 MCG/KG/MIN: 10 INJECTION, EMULSION INTRAVENOUS at 10:30

## 2023-05-24 RX ADMIN — PROPOFOL 200 MG: 10 INJECTION, EMULSION INTRAVENOUS at 10:30

## 2023-05-24 RX ADMIN — CLINDAMYCIN PHOSPHATE 900 MG: 600 INJECTION, SOLUTION INTRAVENOUS at 10:26

## 2023-05-24 RX ADMIN — Medication 100 MCG: at 11:05

## 2023-05-24 RX ADMIN — SODIUM CHLORIDE, SODIUM LACTATE, POTASSIUM CHLORIDE, CALCIUM CHLORIDE: 600; 310; 30; 20 INJECTION, SOLUTION INTRAVENOUS at 09:49

## 2023-05-24 RX ADMIN — LIDOCAINE HYDROCHLORIDE 100 MG: 20 INJECTION, SOLUTION INFILTRATION; PERINEURAL at 10:30

## 2023-05-24 RX ADMIN — ACETAMINOPHEN 975 MG: 325 TABLET ORAL at 09:49

## 2023-05-24 RX ADMIN — GLYCOPYRROLATE 0.2 MG: 0.2 INJECTION, SOLUTION INTRAMUSCULAR; INTRAVENOUS at 10:39

## 2023-05-24 NOTE — OP NOTE
May 24, 2023    Charo Leung      Preoperative diagnosis:  Soft tissue mass mid forehead     Postoperative diagnosis: same     Procedure: Excision of a 2.5 cm x 3 cm lipoma, mid forehead      Surgeon: Gal Orellana MD.     Assistant: Sivan Vaughn CSA (there was no plastic surgery resident available to assist).     Anesthesia:  General and local utilizing lidocaine 1% with epinephrine, 6 mL in the periphery of the lesion     IV fluids:  600 mL    EBL: 1 mL     Findings:  2.5 cm x 3 cm subcutaneous lipoma, midline of forehead.     Specimen:  Lipoma forehead     Drains:  None     Disposition:  Patient tolerated procedure well, she was extubated and transferred to recovery room awake and in a stable condition     Indications:    Ms. Rodriguez is a 58 years old lady with history of soft tissue mass on the midline of her forehead for the last 5 years. This mass has been slowly growing in size.  She would like to have this lesion removed.  Risks were explained to the patient and they include but are not not limited to scarring, infection, bleeding, seroma, wound dehiscence, need for further surgeries.  She has agreed to proceed.    Procedure:    Patient was identified in the preoperative holding area and preoperative IV antibiotics were given to her.    She was then brought to the operating room and was placed supine on the operating room table.  After general anesthesia was obtained the mid forehead around the lesion was cleansed with alcohol and lidocaine 1% with epinephrine, 6 mL were infiltrated in the periphery of this lesion.    Then the forehead was prepped and draped in a sterile surgical fashion.  Following a transverse expression line on her forehead in top of the lesion, an incision was made with a scalpel #15.  Subcutaneous tissues were divided with electrocautery until we reached the capsule of this lipoma.    The lipoma was freed up from surrounding tissue and underlying periosteum with combination  of sharp dissection with scissors and electrocautery.  The lipoma was completely excised and sent to pathology as a specimen.  It measured 2.5 cm x 3 cm.    Irrigation was provided and I found that hemostasis was excellent.  Wound was closed in layers.  Subcutaneous tissue with 4-0 Monocryl buried interrupted stitches and the skin was closed with 6-0 Prolene running stitches.    Instrument count was reported by nursing personnel as correct. Patient tolerated procedure well, she was then extubated and transferred to recovery room awake and in stable condition.    Gal Orellana MD , FACS   Diplomate American Board of Plastic Surgery  Diplomate American Board of Surgery  Adj. Assistant Professor of Surgery  Division of Plastic & Reconstructive Surgery   [facility]  Office: [phone]   5/24/2023 at 2:24 PM

## 2023-05-24 NOTE — ANESTHESIA PROCEDURE NOTES
Airway       Patient location during procedure: OR  Staff -        Performed By: CRNA  Consent for Airway        Urgency: elective  Indications and Patient Condition       Indications for airway management: kem-procedural       Induction type:intravenous       Mask difficulty assessment: 0 - not attempted    Final Airway Details       Final airway type: supraglottic airway    Supraglottic Airway Details        Type: LMA       Brand: LMA Unique       LMA size: 4       Cuff Pressure (cm H2O): 6    Post intubation assessment        Placement verified by: capnometry, equal breath sounds and chest rise        Number of attempts at approach: 1       Secured with: pink tape       Ease of procedure: easy       Dentition: Intact and Unchanged

## 2023-05-24 NOTE — DISCHARGE INSTRUCTIONS
[facility]  Home Care Following Anesthesia  For 24 hours after surgery:  Get plenty of rest.  A responsible adult must stay with you for at least 24 hours after you leave the surgery center.  Do not drive or use heavy equipment.  If you have weakness or tingling, don't drive or use heavy equipment until this feeling goes away.   Do not drink alcohol.   Avoid strenuous or risky activities.  Ask for help when climbing stairs.  You may feel lightheaded.  IF so, sit for a few minutes before standing.  Have someone help you get up.   If you have nausea (feel sick to your stomach): Drink only clear liquids such as apple juice, ginger ale, broth or 7-Up.  Rest may also help.  Be sure to drink enough fluids.  Move to a regular diet as you feel able.   You may have a slight fever.  Call the doctor if your fever is over 100 F (37.7 C) (taken under the tongue) or lasts longer than 24 hours.  You may have a dry mouth, a sore throat, muscle aches or trouble sleeping. These should go away after 24 hours.  Do not make important or legal decisions.   It is recommended to avoid smoking.               Tips for taking pain medications  To get the best pain relief possible, remember these points:  Take pain medications as directed, before pain becomes severe.  Pain medication can upset your stomach: taking it with food may help.  Constipation is a common side effect of pain medication. Drink plenty of  fluids.  Eat foods high in fiber. Take a stool softener if recommended by your doctor or pharmacist.  Do not drink alcohol, drive or operate machinery while taking pain medications.  Ask about other ways to control pain, such as with heat, ice or relaxation.    Tylenol/Acetaminophen Consumption  To help encourage the safe use of acetaminophen, the makers of TYLENOL  have lowered the maximum daily dose for single-ingredient Extra Strength TYLENOL  (acetaminophen) products sold in the [place] from 8 pills  per day (4,000 mg) to 6 pills per day (3,000 mg). The dosing interval has also changed from 2 pills every 4-6 hours to 2 pills every 6 hours.  If you feel your pain relief is insufficient, you may take Tylenol/Acetaminophen in addition to your narcotic pain medication.   Be careful not to exceed 3,000 mg of Tylenol/Acetaminophen in a 24 hour period from all sources.  If you are taking extra strength Tylenol/acetaminophen (500 mg), the maximum dose is 6 tablets in 24 hours.  If you are taking regular strength acetaminophen (325 mg), the maximum dose is 9 tablets in 24 hours.    Call a doctor for any of the following:  Signs of infection (fever, growing tenderness at the surgery site, a large amount of drainage or bleeding, severe pain, foul-smelling drainage, redness, swelling).  It has been over 8 to 10 hours since surgery and you are still not able to urinate (pass water).  Headache for over 24 hours.  Numbness, tingling or weakness the day after surgery (if you had spinal anesthesia).  Signs of Covid-19 infection (temperature over 100 degrees, shortness of breath, cough, loss of taste/smell, generalized body aches, persistent headache, chills, sore throat, nausea/vomiting/diarrhea)  Your doctor is:       Dr. Gal Orellana, Plastic Surgery: [phone]               Or dial [phone] and ask for the resident on call for:  Plastics  For emergency care, call the:  [facility]:  [phone] (TTY for hearing impaired: [phone])

## 2023-05-24 NOTE — ANESTHESIA CARE TRANSFER NOTE
Patient: Charo Leung    Procedure: Procedure(s):  EXCISION, SOFT TISSUE MASS, MID FOREHEAD       Diagnosis: Cyst of skin and subcutaneous tissue [L72.0]  Diagnosis Additional Information: No value filed.    Anesthesia Type:   General     Note:    Oropharynx: oropharynx clear of all foreign objects and spontaneously breathing  Level of Consciousness: awake  Oxygen Supplementation: room air    Independent Airway: airway patency satisfactory and stable  Dentition: dentition unchanged  Vital Signs Stable: post-procedure vital signs reviewed and stable  Report to RN Given: handoff report given  Patient transferred to: PACU    Handoff Report: Identifed the Patient, Identified the Reponsible Provider, Reviewed the pertinent medical history, Discussed the surgical course, Reviewed Intra-OP anesthesia mangement and issues during anesthesia, Set expectations for post-procedure period and Allowed opportunity for questions and acknowledgement of understanding      Vitals:  Vitals Value Taken Time   /70 05/24/23 1130   Temp 97    Pulse 72 05/24/23 1130   Resp 33 05/24/23 1131   SpO2 95 % 05/24/23 1132   Vitals shown include unvalidated device data.    Electronically Signed By: ALYSSA Foote CRNA  May 24, 2023  11:33 AM

## 2023-05-25 LAB
PATH REPORT.COMMENTS IMP SPEC: NORMAL
PATH REPORT.COMMENTS IMP SPEC: NORMAL
PATH REPORT.FINAL DX SPEC: NORMAL
PATH REPORT.GROSS SPEC: NORMAL
PATH REPORT.MICROSCOPIC SPEC OTHER STN: NORMAL
PATH REPORT.RELEVANT HX SPEC: NORMAL
PHOTO IMAGE: NORMAL

## 2023-05-27 NOTE — ANESTHESIA POSTPROCEDURE EVALUATION
Patient: Charo Leung    Procedure: Procedure(s):  EXCISION, SOFT TISSUE MASS, MID FOREHEAD       Anesthesia Type:  General    Note:  Disposition: Outpatient   Postop Pain Control: Uneventful            Sign Out: Well controlled pain   PONV: No   Neuro/Psych: Uneventful            Sign Out: Acceptable/Baseline neuro status   Airway/Respiratory: Uneventful            Sign Out: Acceptable/Baseline resp. status   CV/Hemodynamics: Uneventful            Sign Out: Acceptable CV status; No obvious hypovolemia; No obvious fluid overload   Other NRE: NONE   DID A NON-ROUTINE EVENT OCCUR? No           Last vitals:  Vitals Value Taken Time   /68 05/24/23 1155   Temp 36.2  C (97.2  F) 05/24/23 1154   Pulse 62 05/24/23 1156   Resp 19 05/24/23 1156   SpO2 99 % 05/24/23 1156   Vitals shown include unvalidated device data.    Electronically Signed By: Josue Ritter MD  May 26, 2023  8:01 PM
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
## 2023-05-31 ENCOUNTER — OFFICE VISIT (OUTPATIENT)
Dept: PLASTIC SURGERY | Facility: AMBULATORY SURGERY CENTER | Age: 58
End: 2023-05-31
Payer: COMMERCIAL

## 2023-05-31 DIAGNOSIS — L72.0 CYST OF SKIN AND SUBCUTANEOUS TISSUE: Primary | ICD-10-CM

## 2023-05-31 PROCEDURE — 99207 PR NO CHARGE NURSE ONLY: CPT | Performed by: PLASTIC SURGERY

## 2023-05-31 NOTE — LETTER
5/31/2023         RE: Charo Leung  [address] [place] [address]        Dear Colleague,    Thank you for referring your patient, Charo Leung, to the [facility] [place]. Please see a copy of my visit note below.    Patient here today for suture removal after S/P EXCISION, SOFT TISSUE MASS, MID FOREHEAD on 05/24/2023.    The patient is doing well. Sutures have been removed and Steri Strips applied.     Post-care discussed along with date for steri strips to be removed.     Patient will call with any questions or concerns if they arise before Joceline follow-up appointment.    Maribel Franklin RN on 5/31/2023 at 10:53 AM        Again, thank you for allowing me to participate in the care of your patient.        Sincerely,        Gal Orellana MD

## 2023-05-31 NOTE — PROGRESS NOTES
Patient here today for suture removal after S/P EXCISION, SOFT TISSUE MASS, MID FOREHEAD on 05/24/2023.    The patient is doing well. Sutures have been removed and Steri Strips applied.     Post-care discussed along with date for steri strips to be removed.     Patient will call with any questions or concerns if they arise before Joceline follow-up appointment.    Maribel Franklin RN on 5/31/2023 at 10:53 AM

## 2023-05-31 NOTE — NURSING NOTE
Patient here today for S/P EXCISION, SOFT TISSUE MASS, MID FOREHEAD on 05/24/2023.     The patient is doing well overall but reports fatigue.     Maribel Franklin RN on 5/31/2023 at 10:42 AM

## 2023-06-22 ENCOUNTER — OFFICE VISIT (OUTPATIENT)
Dept: PLASTIC SURGERY | Facility: AMBULATORY SURGERY CENTER | Age: 58
End: 2023-06-22
Payer: COMMERCIAL

## 2023-06-22 DIAGNOSIS — Z86.018 HISTORY OF LIPOMA: Primary | ICD-10-CM

## 2023-06-22 PROCEDURE — 99024 POSTOP FOLLOW-UP VISIT: CPT | Performed by: PLASTIC SURGERY

## 2023-06-22 NOTE — PROGRESS NOTES
is a 58 years old lady status post excision of forehead lipoma.  She is 4 weeks out from surgery.    At the physical exam, incision is healing well.  No sign of hypertrophic scarring or keloid.                Plan: Continue to apply cocoa butter lotion over the scar, continue with scar massage, avoid some burning, utilize sunscreen protection and follow-up with me as needed.  Patient is happy with results.    Gal Orellana MD , FACS   Diplomate American Board of Plastic Surgery  Diplomate American Board of Surgery  Adj. Assistant Professor of Surgery  Division of Plastic & Reconstructive Surgery   [facility]  Office: [phone]   6/22/2023 at 2:56 PM

## 2023-06-22 NOTE — NURSING NOTE
Patient here today for S/P EXCISION, SOFT TISSUE MASS, MID FOREHEAD on 05/24/2023.     Maribel Franklin RN on 6/22/2023 at 2:40 PM

## 2023-07-19 ENCOUNTER — OFFICE VISIT (OUTPATIENT)
Dept: FAMILY MEDICINE | Facility: CLINIC | Age: 58
End: 2023-07-19
Payer: COMMERCIAL

## 2023-07-19 VITALS
DIASTOLIC BLOOD PRESSURE: 90 MMHG | BODY MASS INDEX: 41.65 KG/M2 | HEIGHT: 65 IN | HEART RATE: 73 BPM | RESPIRATION RATE: 16 BRPM | OXYGEN SATURATION: 97 % | SYSTOLIC BLOOD PRESSURE: 154 MMHG | TEMPERATURE: 98.1 F | WEIGHT: 250 LBS

## 2023-07-19 DIAGNOSIS — R03.0 ELEVATED BLOOD PRESSURE READING WITHOUT DIAGNOSIS OF HYPERTENSION: ICD-10-CM

## 2023-07-19 DIAGNOSIS — R06.83 SNORES: ICD-10-CM

## 2023-07-19 DIAGNOSIS — K21.9 GASTROESOPHAGEAL REFLUX DISEASE WITHOUT ESOPHAGITIS: Primary | ICD-10-CM

## 2023-07-19 PROCEDURE — 99213 OFFICE O/P EST LOW 20 MIN: CPT | Performed by: FAMILY MEDICINE

## 2023-07-19 NOTE — PROGRESS NOTES
"  Assessment & Plan     (K21.9) Gastroesophageal reflux disease without esophagitis  (primary encounter diagnosis)  Comment: pt state that she has  Dry and sore throat when she wakes up at morning, and occasionally she has acid burps. During the day she is fine. She was told she had acid reflux. She has tried otc tums, pepcid prn, avoid acid food etc that helped. Certain food make it worse. Denies abd pain, weight loss, n/v/d/c, black stool. Quit smoke 19 years ago. No alcohol.   Plan: advise to avoid acid food, avoid late night snack and dinner. Avoid lay down after eat. otc tums prn     (R06.83) Snores  Comment: pt snores at sleep. Her dry and sore throat at morning may be she opens mouth at sleep.   Plan: Adult Sleep Eval & Management Referral        Will have sleep medicine consult for possible joshua.     (R03.0) Elevated blood pressure reading without diagnosis of hypertension  Comment: pt is high today. Reviewed the chart bp has been normal even low in the past. Denies cp, sob, palpitation, headache and blurry vision. She dose not check bp at home.   Plan: advise to check bp at home. Follow-up in 2 days for nurse visit to recheck bp. If bp continue high will start medication. Advise to call me or go to er if she develops symptoms such as cp, sob, palpitation, headache, blurry vision, mental status change etc.     :[id number]}     BMI:   Estimated body mass index is 41.6 kg/m  as calculated from the following:    Height as of this encounter: 1.651 m (5' 5\").    Weight as of this encounter: 113.4 kg (250 lb).   Weight management plan: Discussed healthy diet and exercise guidelines    See Patient Instructions    Roberta Washington MD  [facility]    Enzo Pan is a 58 year old, presenting for the following health issues:  Gastrophageal Reflux (Waking with a sore dry throat ongoing issues with acid reflux despite trying several home remedies and otc medications.)        7/19/2023     3:43 PM " "  Additional Questions   Roomed by Gen PAULA CMA     History of Present Illness       Reason for visit:  Acid reflux  Symptom onset:  More than a month  Symptoms include:  Sore throat, acid burps  Symptom intensity:  Moderate  Symptom progression:  Staying the same  Had these symptoms before:  Yes  Has tried/received treatment for these symptoms:  Yes  Previous treatment was successful:  No  What makes it worse:  Eating acidic or greasy foods, red wine, eating late in the day  What makes it better:  Apple cider vinegar, avoiding the above    She eats 2-3 servings of fruits and vegetables daily.She consumes 0 sweetened beverage(s) daily.She exercises with enough effort to increase her heart rate 30 to 60 minutes per day.  She exercises with enough effort to increase her heart rate 6 days per week.   She is taking medications regularly.        Review of Systems   Constitutional, HEENT, cardiovascular, pulmonary, gi and gu systems are negative, except as otherwise noted.      Objective    BP (!) 160/90   Pulse 73   Temp 98.1  F (36.7  C) (Oral)   Resp 16   Ht 1.651 m (5' 5\")   Wt 113.4 kg (250 lb)   LMP  (LMP Unknown)   SpO2 97%   BMI 41.60 kg/m    Body mass index is 41.6 kg/m .  Physical Exam   GENERAL: healthy, alert and no distress  NECK: no adenopathy, no asymmetry, masses, or scars and thyroid normal to palpation  RESP: lungs clear to auscultation - no rales, rhonchi or wheezes  CV: regular rate and rhythm, normal S1 S2, no S3 or S4, no murmur, click or rub, no peripheral edema and peripheral pulses strong  ABDOMEN: soft, nontender, no hepatosplenomegaly, no masses and bowel sounds normal  MS: no gross musculoskeletal defects noted, no edema               "

## 2023-07-24 ENCOUNTER — TELEPHONE (OUTPATIENT)
Dept: FAMILY MEDICINE | Facility: CLINIC | Age: 58
End: 2023-07-24

## 2023-07-24 ENCOUNTER — ALLIED HEALTH/NURSE VISIT (OUTPATIENT)
Dept: FAMILY MEDICINE | Facility: CLINIC | Age: 58
End: 2023-07-24
Payer: COMMERCIAL

## 2023-07-24 VITALS
DIASTOLIC BLOOD PRESSURE: 92 MMHG | WEIGHT: 250 LBS | RESPIRATION RATE: 16 BRPM | OXYGEN SATURATION: 97 % | BODY MASS INDEX: 41.6 KG/M2 | HEART RATE: 66 BPM | SYSTOLIC BLOOD PRESSURE: 138 MMHG

## 2023-07-24 DIAGNOSIS — I10 PRIMARY HYPERTENSION: Primary | ICD-10-CM

## 2023-07-24 PROCEDURE — 99207 PR NO CHARGE NURSE ONLY: CPT

## 2023-07-24 NOTE — PROGRESS NOTES
Please inform pt that her bp has been high at clinic. I do suggest her to have follow-up visit now to discuss the medication to control bp. Please help her to make an office follow-up visit.     Roberta Washington MD on 7/24/2023 at 2:08 PM;

## 2023-07-24 NOTE — TELEPHONE ENCOUNTER
Left message to call back for: patient  Information to relay to patient: see below. Ok to use same day or approval slot.

## 2023-07-24 NOTE — PROGRESS NOTES
Charo Leung is a 58 year old year old patient who comes in today for a Blood Pressure check because of ongoing blood pressure monitoring.  Vital Signs as repeated by RN: /92 and then /90.  Patient is taking medication as prescribed  Patient is tolerating medications well - not taking BP medications  Patient is not monitoring Blood Pressure at home.  N/A. Patient is considering at home BP monitoring.  Current complaints: none  Disposition:  Not currently taking BP medications. Will notify Dr. Washington of today's vitals for next steps.    Cindy SALDIVARN, RN, PHN  [facility]

## 2023-07-24 NOTE — PROGRESS NOTES
Charo Leung is a 58 year old patient who comes in today for a Blood Pressure check.  Initial BP:  BP (!) 146/90   Pulse 66   Resp 16   Wt 113.4 kg (250 lb)   LMP  (LMP Unknown)   SpO2 97%   BMI 41.60 kg/m       66  Disposition: BP elevated.  Triage RN notified, patient asked to wait

## 2023-07-27 ENCOUNTER — OFFICE VISIT (OUTPATIENT)
Dept: FAMILY MEDICINE | Facility: CLINIC | Age: 58
End: 2023-07-27
Payer: COMMERCIAL

## 2023-07-27 VITALS
SYSTOLIC BLOOD PRESSURE: 144 MMHG | WEIGHT: 248 LBS | HEIGHT: 65 IN | OXYGEN SATURATION: 98 % | RESPIRATION RATE: 14 BRPM | HEART RATE: 69 BPM | DIASTOLIC BLOOD PRESSURE: 82 MMHG | BODY MASS INDEX: 41.32 KG/M2 | TEMPERATURE: 98.2 F

## 2023-07-27 DIAGNOSIS — E78.49 OTHER HYPERLIPIDEMIA: ICD-10-CM

## 2023-07-27 DIAGNOSIS — I10 PRIMARY HYPERTENSION: Primary | ICD-10-CM

## 2023-07-27 DIAGNOSIS — E03.9 HYPOTHYROIDISM, UNSPECIFIED TYPE: ICD-10-CM

## 2023-07-27 LAB
ANION GAP SERPL CALCULATED.3IONS-SCNC: 12 MMOL/L (ref 7–15)
BUN SERPL-MCNC: 16 MG/DL (ref 8–23)
CALCIUM SERPL-MCNC: 10 MG/DL (ref 8.6–10)
CHLORIDE SERPL-SCNC: 101 MMOL/L (ref 98–107)
CHOLEST SERPL-MCNC: 269 MG/DL
CREAT SERPL-MCNC: 0.81 MG/DL (ref 0.51–0.95)
DEPRECATED HCO3 PLAS-SCNC: 27 MMOL/L (ref 22–29)
GFR SERPL CREATININE-BSD FRML MDRD: 83 ML/MIN/1.73M2
GLUCOSE SERPL-MCNC: 95 MG/DL (ref 70–99)
HDLC SERPL-MCNC: 55 MG/DL
LDLC SERPL CALC-MCNC: 170 MG/DL
NONHDLC SERPL-MCNC: 214 MG/DL
POTASSIUM SERPL-SCNC: 4.4 MMOL/L (ref 3.4–5.3)
SODIUM SERPL-SCNC: 140 MMOL/L (ref 136–145)
TRIGL SERPL-MCNC: 222 MG/DL
TSH SERPL DL<=0.005 MIU/L-ACNC: 0.58 UIU/ML (ref 0.3–4.2)

## 2023-07-27 PROCEDURE — 80048 BASIC METABOLIC PNL TOTAL CA: CPT | Performed by: FAMILY MEDICINE

## 2023-07-27 PROCEDURE — 80061 LIPID PANEL: CPT | Performed by: FAMILY MEDICINE

## 2023-07-27 PROCEDURE — 36415 COLL VENOUS BLD VENIPUNCTURE: CPT | Performed by: FAMILY MEDICINE

## 2023-07-27 PROCEDURE — 84443 ASSAY THYROID STIM HORMONE: CPT | Performed by: FAMILY MEDICINE

## 2023-07-27 PROCEDURE — 99214 OFFICE O/P EST MOD 30 MIN: CPT | Performed by: FAMILY MEDICINE

## 2023-07-27 RX ORDER — LOSARTAN POTASSIUM 50 MG/1
50 TABLET ORAL DAILY
Qty: 90 TABLET | Refills: 3 | Status: SHIPPED | OUTPATIENT
Start: 2023-07-27 | End: 2023-11-03

## 2023-07-27 NOTE — PROGRESS NOTES
Assessment & Plan     (I10) Primary hypertension  (primary encounter diagnosis)  Comment: bp elevated at office again. She just got her bp machine and home test 3 times  >  140/80. Denies cp, sob, palpitation, headache and blurry vision. Former smoker. Family history of htn  Plan: losartan (COZAAR) 50 MG tablet, Basic metabolic        panel        Will start losartan 50 mg daily and close monitor bp at home. If bp is < 100/60 she will hold it. Follow-up in 2 weeks.     (E78.49) Other hyperlipidemia  Comment: we reviewed the chart her LDL was high 6 month with normal 10 years ascvd risk score. She has been doing diet control and exercise.   Plan: Lipid panel reflex to direct LDL Non-fasting        Continue regular exercise and low fat diet. Will follow-up lab.     (E03.9) Hypothyroidism, unspecified type  Comment: doing well with medication.   Plan: TSH with free T4 reflex        Continue current plan.           See Patient Instructions    Roberta Washington MD  [facility]    Enzo Pan is a 58 year old, presenting for the following health issues:  Hypertension (Medication management for HTN. Pt see on Monday for MA visit and BP is still elevated.)      7/27/2023     2:51 PM   Additional Questions   Roomed by Courtney Lyons   Accompanied by none       HPI     Hyperlipidemia Follow-Up    Are you regularly taking any medication or supplement to lower your cholesterol?   No  Are you having muscle aches or other side effects that you think could be caused by your cholesterol lowering medication?  No    Hypertension Follow-up    Do you check your blood pressure regularly outside of the clinic? Yes   Are you following a low salt diet? Yes  Are your blood pressures ever more than 140 on the top number (systolic) OR more   than 90 on the bottom number (diastolic), for example 140/90? Yes    Hypothyroidism Follow-up    Since last visit, patient describes the following symptoms: Weight stable, no hair  "loss, no skin changes, no constipation, no loose stools           Review of Systems   Constitutional, HEENT, cardiovascular, pulmonary, gi and gu systems are negative, except as otherwise noted.      Objective    BP (!) 144/82 (BP Location: Right arm, Patient Position: Sitting, Cuff Size: Adult Large)   Pulse 69   Temp 98.2  F (36.8  C) (Oral)   Resp 14   Ht 1.651 m (5' 5\")   Wt 112.5 kg (248 lb)   LMP  (LMP Unknown)   SpO2 98%   BMI 41.27 kg/m    Body mass index is 41.27 kg/m .  Physical Exam   GENERAL: healthy, alert and no distress  NECK: no adenopathy, no asymmetry, masses, or scars and thyroid normal to palpation  RESP: lungs clear to auscultation - no rales, rhonchi or wheezes  CV: regular rate and rhythm, normal S1 S2, no S3 or S4, no murmur, click or rub, no peripheral edema and peripheral pulses strong  ABDOMEN: soft, nontender, no hepatosplenomegaly, no masses and bowel sounds normal  MS: no gross musculoskeletal defects noted, no edema                 " Neuro

## 2023-07-28 RX ORDER — LEVOTHYROXINE SODIUM 137 UG/1
TABLET ORAL
Qty: 90 TABLET | Refills: 3 | Status: SHIPPED | OUTPATIENT
Start: 2023-07-28 | End: 2024-07-11

## 2023-08-17 ENCOUNTER — E-VISIT (OUTPATIENT)
Dept: FAMILY MEDICINE | Facility: CLINIC | Age: 58
End: 2023-08-17
Payer: COMMERCIAL

## 2023-08-17 DIAGNOSIS — J40 BRONCHITIS: Primary | ICD-10-CM

## 2023-08-17 PROCEDURE — 99421 OL DIG E/M SVC 5-10 MIN: CPT | Performed by: FAMILY MEDICINE

## 2023-08-17 RX ORDER — AZITHROMYCIN 250 MG/1
TABLET, FILM COATED ORAL
Qty: 6 TABLET | Refills: 0 | Status: SHIPPED | OUTPATIENT
Start: 2023-08-17 | End: 2023-08-22

## 2023-08-17 NOTE — PATIENT INSTRUCTIONS
"  Dear Charo Leung    After reviewing your responses, I've been able to diagnose you with \"Bronchitis\" which is a common infection of your lungs. While this is most commonly caused by a virus, the symptoms you have given suggest you should be treated with antibiotics.     I have sent Zithromax to your pharmacy to treat this infection.     It is important that you take all of your prescribed medication even if your symptoms are improving after a few doses. Taking all of your medicine helps prevent the symptoms from returning.     If your symptoms worsen, you develop chest pain or shortness of breath, fevers over 101, or are not improving in 5 days, please contact your primary care provider for an appointment or visit any of our convenient Walk-in Care or Urgent Care Centers to be seen which can be found on our website here.    Thanks again for choosing us as your health care partner,    Roberta Washington MD    "

## 2023-09-15 ENCOUNTER — IMMUNIZATION (OUTPATIENT)
Dept: NURSING | Facility: CLINIC | Age: 58
End: 2023-09-15
Payer: COMMERCIAL

## 2023-09-15 PROCEDURE — 90682 RIV4 VACC RECOMBINANT DNA IM: CPT

## 2023-09-15 PROCEDURE — 90471 IMMUNIZATION ADMIN: CPT

## 2023-09-22 ENCOUNTER — E-VISIT (OUTPATIENT)
Dept: FAMILY MEDICINE | Facility: CLINIC | Age: 58
End: 2023-09-22
Payer: COMMERCIAL

## 2023-09-22 DIAGNOSIS — J06.9 VIRAL URI WITH COUGH: Primary | ICD-10-CM

## 2023-09-22 PROCEDURE — 99421 OL DIG E/M SVC 5-10 MIN: CPT | Performed by: FAMILY MEDICINE

## 2023-09-22 RX ORDER — DEXTROMETHORPHAN POLISTIREX 30 MG/5ML
10 SUSPENSION ORAL 2 TIMES DAILY PRN
Qty: 89 ML | Refills: 0 | Status: SHIPPED | OUTPATIENT
Start: 2023-09-22 | End: 2023-11-03

## 2023-09-22 NOTE — PATIENT INSTRUCTIONS
Viral Respiratory Infection: Care Instructions  Overview     A viral respiratory infection is an infection of the nose, sinuses, or throat caused by a virus. Colds and the flu are common types of viral respiratory infections.  The symptoms of a viral respiratory infection often start quickly. They include a fever, sore throat, and runny nose. You may also just not feel well. Or you may not want to eat much.  Most viral infections can be treated with home care. This may include drinking lots of fluids and taking over-the-counter pain medicine. You will probably feel better in 4 to 10 days.  Antibiotics are not used to treat a viral infection. Antibiotics don't kill viruses, so they won't help cure a viral illness.  In some cases, a doctor may prescribe antiviral medicine to help your body fight a serious viral infection.  Follow-up care is a key part of your treatment and safety. Be sure to make and go to all appointments, and call your doctor if you are having problems. It's also a good idea to know your test results and keep a list of the medicines you take.  How can you care for yourself at home?  To prevent dehydration, drink plenty of fluids. Choose water and other clear liquids until you feel better. If you have kidney, heart, or liver disease and have to limit fluids, talk with your doctor before you increase the amount of fluids you drink.  Ask your doctor if you can take an over-the-counter pain medicine, such as acetaminophen (Tylenol), ibuprofen (Advil, Motrin), or naproxen (Aleve). Be safe with medicines. Read and follow all instructions on the label. No one younger than 20 should take aspirin. It has been linked to Reye syndrome, a serious illness.  Be careful when taking over-the-counter cold or flu medicines and Tylenol at the same time. Many of these medicines have acetaminophen, which is Tylenol. Read the labels to make sure that you are not taking more than the recommended dose. Too much  "acetaminophen (Tylenol) can be harmful.  Get plenty of rest.  Use saline (saltwater) nasal washes to help keep your nasal passages open and wash out mucus and allergens. You can buy saline nose sprays at a grocery store or drugstore. Follow the instructions on the package. Or you can make your own at home. Add 1 teaspoon of non-iodized salt and 1 teaspoon of baking soda to 2 cups of distilled or boiled and cooled water. Fill a squeeze bottle or neti pot with the nasal wash. Then put the tip into your nostril, and lean over the sink. With your mouth open, gently squirt the liquid. Repeat on the other side.  Use a vaporizer or humidifier to add moisture to your bedroom. Follow the instructions for cleaning the machine.  Do not smoke or allow others to smoke around you. If you need help quitting, talk to your doctor about stop-smoking programs and medicines. These can increase your chances of quitting for good.  When should you call for help?   Call 911 anytime you think you may need emergency care. For example, call if:    You have severe trouble breathing.   Call your doctor now or seek immediate medical care if:    You have a new or higher fever.     Your fever lasts more than 48 hours.     You have trouble breathing.     You have a fever with a stiff neck or a severe headache.     You are sensitive to light.     You feel very sleepy or confused.   Watch closely for changes in your health, and be sure to contact your doctor if:    You do not get better as expected.   Where can you learn more?  Go to https://www.[employer].net/patiented  Enter Q795 in the search box to learn more about \"Viral Respiratory Infection: Care Instructions.\"  Current as of: October 31, 2022               Content Version: 13.7    0448-3785 [employer].   Care instructions adapted under license by your healthcare professional. If you have questions about a medical condition or this instruction, always ask your healthcare " professional. [employer] disclaims any warranty or liability for your use of this information.

## 2023-11-03 ENCOUNTER — OFFICE VISIT (OUTPATIENT)
Dept: FAMILY MEDICINE | Facility: CLINIC | Age: 58
End: 2023-11-03
Payer: COMMERCIAL

## 2023-11-03 ENCOUNTER — PATIENT OUTREACH (OUTPATIENT)
Dept: GASTROENTEROLOGY | Facility: CLINIC | Age: 58
End: 2023-11-03
Payer: COMMERCIAL

## 2023-11-03 VITALS
SYSTOLIC BLOOD PRESSURE: 165 MMHG | TEMPERATURE: 97.6 F | RESPIRATION RATE: 16 BRPM | DIASTOLIC BLOOD PRESSURE: 82 MMHG | WEIGHT: 251 LBS | BODY MASS INDEX: 41.77 KG/M2 | OXYGEN SATURATION: 97 % | HEART RATE: 73 BPM

## 2023-11-03 DIAGNOSIS — H10.9 CONJUNCTIVITIS OF BOTH EYES, UNSPECIFIED CONJUNCTIVITIS TYPE: ICD-10-CM

## 2023-11-03 DIAGNOSIS — J06.9 UPPER RESPIRATORY TRACT INFECTION, UNSPECIFIED TYPE: Primary | ICD-10-CM

## 2023-11-03 LAB — SARS-COV-2 RNA RESP QL NAA+PROBE: NEGATIVE

## 2023-11-03 PROCEDURE — 87635 SARS-COV-2 COVID-19 AMP PRB: CPT | Performed by: PHYSICIAN ASSISTANT

## 2023-11-03 PROCEDURE — 99213 OFFICE O/P EST LOW 20 MIN: CPT | Performed by: PHYSICIAN ASSISTANT

## 2023-11-03 NOTE — PROGRESS NOTES
Assessment & Plan     Upper respiratory tract infection, unspecified type  Pt has uri, no sign of secondary bacterial infection.  No indication for abx at this time.    - Symptomatic COVID-19 Virus (Coronavirus) by PCR Nose    Conjunctivitis of both eyes, unspecified conjunctivitis type  Discussed conjunctivitis is likely viral in etiology along with the uri she has.  Recommend cool compresses, PI given and discussed for viral conjunctivitis.    Covid 19 pending.  If positive should seek tx with oral antiviral.          Karoline Perez PA-C  [facility]    Enoz Pan is a 58 year old female who presents to clinic today for the following health issues:  Chief Complaint   Patient presents with    Eye Problem     Left eye redness , swollen     Nasal Congestion     For few days      HPI    L eye mattering, drainage, and starting on the R today, no eye symptoms last night, woke this morning with redness, swelling, irritation.      4 day hx of sinus problem (started 10-31-23) . Rhionrrhea, congestion. Sore throat which is better. Hoarsness, greenish discharge. No fevers. No covid 19 testing done at home yet.    No contact use.    Some mattering but not purulent discharge.    No vision changes.    No eye pain, feels scratchy.    Exposure to 1 year old grandchild.        Review of Systems  Constitutional, HEENT, cardiovascular, pulmonary, gi and gu systems are negative, except as otherwise noted.      Objective    BP (!) 165/82   Pulse 73   Temp 97.6  F (36.4  C)   Resp 16   Wt 113.9 kg (251 lb)   LMP  (LMP Unknown)   SpO2 97%   BMI 41.77 kg/m    Physical Exam   Pt is in no acute distress and appears well  Conjunctiva : injected L greater than R, no purulent discharge but some clear watering which is mild, lids; L upper lid slightly edematous, lower lids unremarkable B. .    Ears patent B:  TM s intact, non-injected. All land marks easily visibile    Nasal mucosa is  non-edematous, no discharge.    Pharynx: non erythematous, tonsils non hypertrophied, No exudate   Neck supple: no adenopathy  Lungs: CTA  Heart: RRR, no murmur, no thrills or heaves   Ext: no edema  Skin: no rashes

## 2023-11-14 ENCOUNTER — IMMUNIZATION (OUTPATIENT)
Dept: NURSING | Facility: CLINIC | Age: 58
End: 2023-11-14
Payer: COMMERCIAL

## 2023-11-14 PROCEDURE — 91320 SARSCV2 VAC 30MCG TRS-SUC IM: CPT

## 2023-11-14 PROCEDURE — 90480 ADMN SARSCOV2 VAC 1/ONLY CMP: CPT

## 2023-11-29 ENCOUNTER — HOSPITAL ENCOUNTER (OUTPATIENT)
Dept: MAMMOGRAPHY | Facility: CLINIC | Age: 58
Discharge: HOME | End: 2023-11-29
Attending: FAMILY MEDICINE | Admitting: FAMILY MEDICINE
Payer: COMMERCIAL

## 2023-11-29 DIAGNOSIS — Z12.31 VISIT FOR SCREENING MAMMOGRAM: ICD-10-CM

## 2023-11-29 PROCEDURE — 77067 SCR MAMMO BI INCL CAD: CPT

## 2023-12-18 ENCOUNTER — ALLIED HEALTH/NURSE VISIT (OUTPATIENT)
Dept: FAMILY MEDICINE | Facility: CLINIC | Age: 58
End: 2023-12-18
Payer: COMMERCIAL

## 2023-12-18 DIAGNOSIS — Z23 ENCOUNTER FOR IMMUNIZATION: Primary | ICD-10-CM

## 2023-12-18 PROCEDURE — 90471 IMMUNIZATION ADMIN: CPT

## 2023-12-18 PROCEDURE — 90750 HZV VACC RECOMBINANT IM: CPT

## 2023-12-18 NOTE — PROGRESS NOTES
Prior to immunization administration, verified patients identity using patient s name and date of birth. Please see Immunization Activity for additional information.     Screening Questionnaire for Adult Immunization    Are you sick today?   No   Do you have allergies to medications, food, a vaccine component or latex?   Yes   Have you ever had a serious reaction after receiving a vaccination?   No   Do you have a long-term health problem with heart, lung, kidney, or metabolic disease (e.g., diabetes), asthma, a blood disorder, no spleen, complement component deficiency, a cochlear implant, or a spinal fluid leak?  Are you on long-term aspirin therapy?   No   Do you have cancer, leukemia, HIV/AIDS, or any other immune system problem?   No   Do you have a parent, brother, or sister with an immune system problem?   Yes   In the past 3 months, have you taken medications that affect  your immune system, such as prednisone, other steroids, or anticancer drugs; drugs for the treatment of rheumatoid arthritis, Crohn s disease, or psoriasis; or have you had radiation treatments?   No   Have you had a seizure, or a brain or other nervous system problem?   No   During the past year, have you received a transfusion of blood or blood    products, or been given immune (gamma) globulin or antiviral drug?   No   For women: Are you pregnant or is there a chance you could become       pregnant during the next month?   No   Have you received any vaccinations in the past 4 weeks?   No     Immunization questionnaire answers were all negative.    I have reviewed the following standing orders:   This patient is due and qualifies for the Zoster vaccine.    Click here for Zoster Standing Order    I have reviewed the vaccines inclusion and exclusion criteria; No concerns regarding eligibility.     Patient instructed to remain in clinic for 15 minutes afterwards, and to report any adverse reactions.     Screening performed by Haleigh REYES  NAVIN Polk on 12/18/2023 at 10:14 AM.

## 2024-01-04 ENCOUNTER — PATIENT OUTREACH (OUTPATIENT)
Dept: CARE COORDINATION | Facility: CLINIC | Age: 59
End: 2024-01-04
Payer: COMMERCIAL

## 2024-01-18 ENCOUNTER — PATIENT OUTREACH (OUTPATIENT)
Dept: CARE COORDINATION | Facility: CLINIC | Age: 59
End: 2024-01-18
Payer: COMMERCIAL

## 2024-01-29 PROBLEM — E66.01 MORBID OBESITY (H): Chronic | Status: ACTIVE | Noted: 2019-07-08

## 2024-01-29 PROBLEM — L72.0 CYST OF SKIN AND SUBCUTANEOUS TISSUE: Status: RESOLVED | Noted: 2023-04-13 | Resolved: 2024-01-29

## 2024-01-29 PROBLEM — Z78.0 MENOPAUSE: Status: ACTIVE | Noted: 2018-09-01

## 2024-01-29 ASSESSMENT — SLEEP AND FATIGUE QUESTIONNAIRES
HOW LIKELY ARE YOU TO NOD OFF OR FALL ASLEEP WHILE SITTING QUIETLY AFTER LUNCH WITHOUT ALCOHOL: WOULD NEVER DOZE
HOW LIKELY ARE YOU TO NOD OFF OR FALL ASLEEP WHILE SITTING INACTIVE IN A PUBLIC PLACE: WOULD NEVER DOZE
HOW LIKELY ARE YOU TO NOD OFF OR FALL ASLEEP WHEN YOU ARE A PASSENGER IN A CAR FOR AN HOUR WITHOUT A BREAK: WOULD NEVER DOZE
HOW LIKELY ARE YOU TO NOD OFF OR FALL ASLEEP WHILE LYING DOWN TO REST IN THE AFTERNOON WHEN CIRCUMSTANCES PERMIT: SLIGHT CHANCE OF DOZING
HOW LIKELY ARE YOU TO NOD OFF OR FALL ASLEEP IN A CAR, WHILE STOPPED FOR A FEW MINUTES IN TRAFFIC: WOULD NEVER DOZE
HOW LIKELY ARE YOU TO NOD OFF OR FALL ASLEEP WHILE WATCHING TV: MODERATE CHANCE OF DOZING
HOW LIKELY ARE YOU TO NOD OFF OR FALL ASLEEP WHILE SITTING AND TALKING TO SOMEONE: WOULD NEVER DOZE
HOW LIKELY ARE YOU TO NOD OFF OR FALL ASLEEP WHILE SITTING AND READING: SLIGHT CHANCE OF DOZING

## 2024-01-30 ENCOUNTER — VIRTUAL VISIT (OUTPATIENT)
Dept: SLEEP MEDICINE | Facility: CLINIC | Age: 59
End: 2024-01-30
Attending: FAMILY MEDICINE
Payer: COMMERCIAL

## 2024-01-30 VITALS — WEIGHT: 242 LBS | HEIGHT: 65 IN | BODY MASS INDEX: 40.32 KG/M2

## 2024-01-30 DIAGNOSIS — R53.81 MALAISE AND FATIGUE: ICD-10-CM

## 2024-01-30 DIAGNOSIS — E66.813 CLASS 3 SEVERE OBESITY DUE TO EXCESS CALORIES WITH BODY MASS INDEX (BMI) OF 40.0 TO 44.9 IN ADULT, UNSPECIFIED WHETHER SERIOUS COMORBIDITY PRESENT (H): ICD-10-CM

## 2024-01-30 DIAGNOSIS — R53.83 MALAISE AND FATIGUE: ICD-10-CM

## 2024-01-30 DIAGNOSIS — R06.83 SNORES: ICD-10-CM

## 2024-01-30 DIAGNOSIS — R06.89 DYSPNEA AND RESPIRATORY ABNORMALITY: Primary | ICD-10-CM

## 2024-01-30 DIAGNOSIS — E66.01 CLASS 3 SEVERE OBESITY DUE TO EXCESS CALORIES WITH BODY MASS INDEX (BMI) OF 40.0 TO 44.9 IN ADULT, UNSPECIFIED WHETHER SERIOUS COMORBIDITY PRESENT (H): ICD-10-CM

## 2024-01-30 DIAGNOSIS — R06.00 DYSPNEA AND RESPIRATORY ABNORMALITY: Primary | ICD-10-CM

## 2024-01-30 DIAGNOSIS — Z72.820 LACK OF ADEQUATE SLEEP: ICD-10-CM

## 2024-01-30 PROCEDURE — 99204 OFFICE O/P NEW MOD 45 MIN: CPT | Mod: 95 | Performed by: PHYSICIAN ASSISTANT

## 2024-01-30 ASSESSMENT — PAIN SCALES - GENERAL: PAINLEVEL: MILD PAIN (3)
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
## 2024-01-30 NOTE — NURSING NOTE
Is the patient currently in the state of [place]? YES    Visit mode:VIDEO    If the visit is dropped, the patient can be reconnected by: VIDEO VISIT: Send to e-mail at: [email or web address]    Will anyone else be joining the visit? NO  (If patient encounters technical issues they should call [phone])    How would you like to obtain your AVS? MyChart    Are changes needed to the allergy or medication list? Pt stated no changes to allergies and Pt stated no med changes    Reason for visit: Consult  Has patient had flu shot for current/most recent flu season? If so, when? Yes: 09/05/2023    Sue ROSARIO

## 2024-01-30 NOTE — PATIENT INSTRUCTIONS
"          MY TREATMENT INFORMATION FOR SLEEP APNEA-  Charo Leung    DOCTOR : DELICIA Herron    Am I having a sleep study at a sleep center?  --->Due to normal delays, you will be contacted within 2-4 weeks to schedule    Am I having a home sleep study?  --->Watch the video for the device you are using:    -/drop off device-   https://www.NakedRoomube.com/watch?v=yGGFBdELGhk    -Disposable device sent out require phone/computer application-   https://www.Crashlytics.com/watch?v=BCce_vbiwxE      Frequently asked questions:  1. What is Obstructive Sleep Apnea (FRANK)? FRANK is the most common type of sleep apnea. Apnea means, \"without breath.\"  Apnea is most often caused by narrowing or collapse of the upper airway as muscles relax during sleep.   Almost everyone has occasional apneas. Most people with sleep apnea have had brief interruptions at night frequently for many years.  The severity of sleep apnea is related to how frequent and severe the events are.   2. What are the consequences of FRANK? Symptoms include: feeling sleepy during the day, snoring loudly, gasping or stopping of breathing, trouble sleeping, and occasionally morning headaches or heartburn at night.  Sleepiness can be serious and even increase the risk of falling asleep while driving. Other health consequences may include development of high blood pressure and other cardiovascular disease in persons who are susceptible. Untreated FRANK  can contribute to heart disease, stroke and diabetes.   3. What are the treatment options? In most situations, sleep apnea is a lifelong disease that must be managed with daily therapy. Medications are not effective for sleep apnea and surgery is generally not considered until other therapies have been tried. Your treatment is your choice . Continuous Positive Airway (CPAP) works right away and is the therapy that is effective in nearly everyone. An oral device to hold your jaw forward is usually the next most " reliable option. Other options include postioning devices (to keep you off your back), weight loss, and surgery including a tongue pacing device. There is more detail about some of these options below.  4. Are my sleep studies covered by insurance? Although we will request verification of coverage, we advise you also check in advance of the study to ensure there is coverage.    Important tips for those choosing CPAP and similar devices  For new devices, sign up for device ELICIA to monitor your device for your followup visits  We encourage you to utilize the [employer] elicia or website (myAir web (resmed.com) ) to monitor your therapy progress and share the data with your healthcare team when you discuss your sleep apnea.                                                    Know your equipment:  CPAP is continuous positive airway pressure that prevents obstructive sleep apnea by keeping the throat from collapsing while you are sleeping. In most cases, the device is  smart  and can slowly self-adjusts if your throat collapses and keeps a record every day of how well you are treated-this information is available to you and your care team.  BPAP is bilevel positive airway pressure that keeps your throat open and also assists each breath with a pressure boost to maintain adequate breathing.  Special kinds of BPAP are used in patients who have inadequate breathing from lung or heart disease. In most cases, the device is  smart  and can slowly self-adjusts to assist breathing. Like CPAP, the device keeps a record of how well you are treated.  Your mask is your connection to the device. You get to choose what feels most comfortable and the staff will help to make sure if fits. Here: are some examples of the different masks that are available: Magnetic mask aids may assist with use but there are safety issues that should be addressed when considering with magnets* ( see end of discussion).       Key points to remember on your  journey with sleep apnea:  Sleep study.  PAP devices often need to be adjusted during a sleep study to show that they are effective and adjusted right.  Good tips to remember: Try wearing just the mask during a quiet time during the day so your body adapts to wearing it. A humidifier is recommended for comfort in most cases to prevent drying of your nose and throat. Allergy medication from your provider may help you if you are having nasal congestion.  Getting settled-in. It takes more than one night for most of us to get used to wearing a mask. Try wearing just the mask during a quiet time during the day so your body adapts to wearing it. A humidifier is recommended for comfort in most cases. Our team will work with you carefully on the first day and will be in contact within 4 days and again at 2 and 4 weeks for advice and remote device adjustments. Your therapy is evaluated by the device each day.   Use it every night. The more you are able to sleep naturally for 7-8 hours, the more likely you will have good sleep and to prevent health risks or symptoms from sleep apnea. Even if you use it 4 hours it helps. Occasionally all of us are unable to use a medical therapy, in sleep apnea, it is not dangerous to miss one night.   Communicate. Call our skilled team on the number provided on the first day if your visit for problems that make it difficult to wear the device. Over 2 out of 3 patients can learn to wear the device long-term with help from our team. Remember to call our team or your sleep providers if you are unable to wear the device as we may have other solutions for those who cannot adapt to mask CPAP therapy. It is recommended that you sleep your sleep provider within the first 3 months and yearly after that if you are not having problems.   Use it for your health. We encourage use of CPAP masks during daytime quiet periods to allow your face and brain to adapt to the sensation of CPAP so that it will be a  more natural sensation to awaken to at night or during naps. This can be very useful during the first few weeks or months of adapting to CPAP though it does not help medically to wear CPAP during wakefulness and  should not be used as a strategy just to meet guidelines.  Take care of your equipment. Make sure you clean your mask and tubing using directions every day and that your filter and mask are replaced as recommended or if they are not working.     *Masks with magnets:  Updated Contraindications  Masks with magnetic components are contraindicated for use by patients where they, or anyone in close physical contact while using the mask, have the following:   Active medical implants that interact with magnets (i.e., pacemakers, implantable cardioverter defibrillators (ICD), neurostimulators, cerebrospinal fluid (CSF) shunts, insulin/infusion pumps)   Metallic implants/objects containing ferromagnetic material (i.e., aneurysm clips/flow disruption devices, embolic coils, stents, valves, electrodes, implants to restore hearing or balance with implanted magnets, ocular implants, metallic splinters in the eye)  Updated Warning  Keep the mask magnets at a safe distance of at least 6 inches (150 mm) away from implants or medical devices that may be adversely affected by magnetic interference. This warning applies to you or anyone in close physical contact with your mask. The magnets are in the frame and lower headgear clips, with a magnetic field strength of up to 400mT. When worn, they connect to secure the mask but may inadvertently detach while asleep.  Implants/medical devices, including those listed within contraindications, may be adversely affected if they change function under external magnetic fields or contain ferromagnetic materials that attract/repel to magnetic fields (some metallic implants, e.g., contact lenses with metal, dental implants, metallic cranial plates, screws, eloise hole covers, and bone  substitute devices). Consult your physician and  of your implant / other medical device for information on the potential adverse effects of magnetic fields.    BESIDES CPAP, WHAT OTHER THERAPIES ARE THERE?    Positioning Device  Positioning devices are generally used when sleep apnea is mild and only occurs on your back.This example shows a pillow that straps around the waist. It may be appropriate for those whose sleep study shows milder sleep apnea that occurs primarily when lying flat on one's back. Preliminary studies have shown benefit but effectiveness at home may need to be verified by a home sleep test. These devices are generally not covered by medical insurance.  Examples of devices that maintain sleeping on the back to prevent snoring and mild sleep apnea.    Belt type body positioner  http://CANWE STUDIOS/    Electronic reminder  http://nightshifttherapy.com/            Oral Appliance  What is oral appliance therapy?  An oral appliance device fits on your teeth at night like a retainer used after having braces. The device is made by a specialized dentist and requires several visits over 1-2 months before a manufactured device is made to fit your teeth and is adjusted to prevent your sleep apnea. Once an oral device is working properly, snoring should be improved. A home sleep test may be recommended at that time if to determine whether the sleep apnea is adequately treated.       Some things to remember:  -Oral devices are often, but not always, covered by your medical insurance. Be sure to check with your insurance provider.   -If you are referred for oral therapy, you will be given a list of specialized dentists to consider or you may choose to visit the Web site of the American Academy of Dental Sleep Medicine  -Oral devices are less likely to work if you have severe sleep apnea or are extremely overweight.     More detailed information  An oral appliance is a small acrylic device that fits  over the upper and lower teeth  (similar to a retainer or a mouth guard). This device slightly moves jaw forward, which moves the base of the tongue forward, opens the airway, improves breathing for effective treat snoring and obstructive sleep apnea in perhaps 7 out of 10 people .  The best working devices are custom-made by a dental device  after a mold is made of the teeth 1, 2, 3.  When is an oral appliance indicated?  Oral appliance therapy is recommended as a first-line treatment for patients with primary snoring, mild sleep apnea, and for patients with moderate sleep apnea who prefer appliance therapy to use of CPAP4, 5. Severity of sleep apnea is determined by sleep testing and is based on the number of respiratory events per hour of sleep.   How successful is oral appliance therapy?  The success rate of oral appliance therapy in patients with mild sleep apnea is 75-80% while in patients with moderate sleep apnea it is 50-70%. The chance of success in patients with severe sleep apnea is 40-50%. The research also shows that oral appliances have a beneficial effect on the cardiovascular health of FARNK patients at the same magnitude as CPAP therapy7.  Oral appliances should be a second-line treatment in cases of severe sleep apnea, but if not completely successful then a combination therapy utilizing CPAP plus oral appliance therapy may be effective. Oral appliances tend to be effective in a broad range of patients although studies show that the patients who have the highest success are females, younger patients, those with milder disease, and less severe obesity. 3, 6.   Finding a dentist that practices dental sleep medicine  Specific training is available through the American Academy of Dental Sleep Medicine for dentists interested in working in the field of sleep. To find a dentist who is educated in the field of sleep and the use of oral appliances, near you, visit the Web site of the American  Academy of Dental Sleep Medicine.    References  1. Bryan et al. Objectively measured vs self-reported compliance during oral appliance therapy for sleep-disordered breathing. Chest 2013; 144(5): 5466-6532.  2. Finesse et al. Objective measurement of compliance during oral appliance therapy for sleep-disordered breathing. Thorax 2013; 68(1): 91-96.  3. Rob, et al. Mandibular advancement devices in 620 men and women with FRANK and snoring: tolerability and predictors of treatment success. Chest 2004; 125: 4292-3968.  4. Arlette et al. Oral appliances for snoring and FRANK: a review. Sleep 2006; 29: 244-262.  5. Lila et al. Oral appliance treatment for FRANK: an update. J Clin Sleep Med 2014; 10(2): 215-227.  6. Aaron et al. Predictors of OSAH treatment outcome. J Dent Res 2007; 86: 5792-7238.      Weight Loss:    Weight loss is a long-term strategy that may improve sleep apnea in some patients.    Weight management is a personal decision and the decision should be based on your interest and the potential benefits.  If you are interested in exploring weight loss strategies, the following discussion covers the impact on weight loss on sleep apnea and the approaches that may be successful.    Being overweight does not necessarily mean you will have health consequences.  Those who have BMI over 35 or over 27 with existing medical conditions carries greater risk.   Weight loss decreases severity of sleep apnea in most people with obesity. For those with mild obesity who have developed snoring with weight gain, even 15-30 pound weight loss can improve and occasionally eliminate sleep apnea.  Structured and life-long dietary and health habits are necessary to lose weight and keep healthier weight levels.     Though there may be significant health benefits from weight loss, long-term weight loss is very difficult to achieve- studies show success with dietary management in less than 10% of people. In  addition, substantial weight loss may require years of dietary control and may be difficult if patients have severe obesity. In these cases, surgical management may be considered.  Finally, older individuals who have tolerated obesity without health complications may be less likely to benefit from weight loss strategies.      Body mass index is 40.27 kg/m .    Surgery:    Surgery for obstructive sleep apnea is considered generally only when other therapies fail to work. Surgery may be discussed with you if you are having a difficult time tolerating CPAP and or when there is an abnormal structure that requires surgical correction.  Nose and throat surgeries often enlarge the airway to prevent collapse.  Most of these surgeries create pain for 1-2 weeks and up to half of the most common surgeries are not effective throughout life.  You should carefully discuss the benefits and drawbacks to surgery with your sleep provider and surgeon to determine if it is the best solution for you.   More information  Surgery for FRANK is directed at areas that are responsible for narrowing or complete obstruction of the airway during sleep.  There are a wide range of procedures available to enlarge and/or stabilize the airway to prevent blockage of breathing in the three major areas where it can occur: the palate, tongue, and nasal regions.  Successful surgical treatment depends on the accurate identification of the factors responsible for obstructive sleep apnea in each person.  A personalized approach is required because there is no single treatment that works well for everyone.  Because of anatomic variation, consultation with an examination by a sleep surgeon is a critical first step in determining what surgical options are best for each patient.  In some cases, examination during sedation may be recommended in order to guide the selection of procedures.  Patients will be counseled about risks and benefits as well as the typical  recovery course after surgery. Surgery is typically not a cure for a person s FRANK.  However, surgery will often significantly improve one s FRANK severity (termed  success rate ).  Even in the absence of a cure, surgery will decrease the cardiovascular risk associated with OSA7; improve overall quality of life8 (sleepiness, functionality, sleep quality, etc).      Palate Procedures:  Patients with FRANK often have narrowing of their airway in the region of their tonsils and uvula.  The goals of palate procedures are to widen the airway in this region as well as to help the tissues resist collapse.  Modern palate procedure techniques focus on tissue conservation and soft tissue rearrangement, rather than tissue removal.  Often the uvula is preserved in this procedure. Residual sleep apnea is common in patient after pharyngoplasty with an average reduction in sleep apnea events of 33%2.      Tongue Procedures:  ExamWhile patients are awake, the muscles that surround the throat are active and keep this region open for breathing. These muscles relax during sleep, allowing the tongue and other structures to collapse and block breathing.  There are several different tongue procedures available.  Selection of a tongue base procedure depends on characteristics seen on physical exam.  Generally, procedures are aimed at removing bulky tissues in this area or preventing the back of the tongue from falling back during sleep.  Success rates for tongue surgery range from 50-62%3.    Hypoglossal Nerve Stimulation:  Hypoglossal nerve stimulation has recently received approval from the [place] Food and Drug Administration for the treatment of obstructive sleep apnea.  This is based on research showing that the system was safe and effective in treating sleep apnea6.  Results showed that the median AHI score decreased 68%, from 29.3 to 9.0. This therapy uses an implant system that senses breathing patterns and delivers mild  stimulation to airway muscles, which keeps the airway open during sleep.  The system consists of three fully implanted components: a small generator (similar in size to a pacemaker), a breathing sensor, and a stimulation lead.  Using a small handheld remote, a patient turns the therapy on before bed and off upon awakening.    Candidates for this device must be greater than 18 years of age, have moderate to severe obstructive sleep apnea with less than 25% central events  (AHI between 15-65), BMI less than 35, have tried CPAP/oral appliance for at least 8 weeks without success, and have appropriate upper airway anatomy (determined by a sleep endoscopy performed by Dr. Amadeo Gilman or Dr. Itz Urban).    Nasal Procedures:  Nasal obstruction can interfere with nasal breathing during the day and night.  Studies have shown that relief of nasal obstruction can improve the ability of some patients to tolerate positive airway pressure therapy for obstructive sleep apnea1.  Treatment options include medications such as nasal saline, topical corticosteroid and antihistamine sprays, and oral medications such as antihistamines or decongestants. Non-surgical treatments can include external nasal dilators for selected patients. If these are not successful by themselves, surgery can improve the nasal airway either alone or in combination with these other options.        Combination Procedures:  Combination of surgical procedures and other treatments may be recommended, particularly if patients have more than one area of narrowing or persistent positional disease.  The success rate of combination surgery ranges from 66-80%2,3.    References  Sara MADDEN. The Role of the Nose in Snoring and Obstructive Sleep Apnoea: An Update.  Eur Arch Otorhinolaryngol. 2011; 268: 1365-73.   Gilberto SM; Allegra JA; Talha JR; Pallanch JF; Florin CANALES; Edgardo OCHOA; Shalonda SHERWOOD. Surgical modifications of the upper airway for obstructive sleep apnea in  adults: a systematic review and meta-analysis. SLEEP 2010;33(10):5003-4425. Tobi ZIMMERMAN. Hypopharyngeal surgery in obstructive sleep apnea: an evidence-based medicine review.  Arch Otolaryngol Head Neck Surg. 2006 Feb;132(2):206-13.  Cruz YH1, Chalino Y, Tristan LILI. The efficacy of anatomically based multilevel surgery for obstructive sleep apnea. Otolaryngol Head Neck Surg. 2003 Oct;129(4):327-35.  Kezirian E, Goldberg A. Hypopharyngeal Surgery in Obstructive Sleep Apnea: An Evidence-Based Medicine Review. Arch Otolaryngol Head Neck Surg. 2006 Feb;132(2):206-13.  Kathleen BRUSH et al. Upper-Airway Stimulation for Obstructive Sleep Apnea.  N Engl J Med. 2014 Jan 9;370(2):139-49.  Jessa Y et al. Increased Incidence of Cardiovascular Disease in Middle-aged Men with Obstructive Sleep Apnea. Am J Respir Crit Care Med; 2002 166: 159-165  Cunha EM et al. Studying Life Effects and Effectiveness of Palatopharyngoplasty (SLEEP) study: Subjective Outcomes of Isolated Uvulopalatopharyngoplasty. Otolaryngol Head Neck Surg. 2011; 144: 623-631.        WHAT IF I ONLY HAVE SNORING?    Mandibular advancement devices, lateral sleep positioning, long-term weight loss and treatment of nasal allergies have been shown to improve snoring.  Exercising tongue muscles with a game (https://apps.HomeShop18/us/elicia/[employer]-reduce-snoring/ya9816284516) or stimulating the tongue during the day with a device (https://doi.org/10.3390/wed23207643) have improved snoring in some individuals.  https://www.[employer]com/  https://www.sleepfoundation.org/best-anti-snoring-mouthpieces-and-mouthguards    Remember to Drive Safe... Drive Alive     Sleep health profoundly affects your health, mood, and your safety.  Thirty three percent of the population (one in three of us) is not getting enough sleep and many have a sleep disorder. Not getting enough sleep or having an untreated / undertreated sleep condition may make us sleepy without even knowing it. In fact, our  driving could be dramatically impaired due to our sleep health. As your provider, here are some things I would like you to know about driving:     Here are some warning signs for impairment and dangerous drowsy driving:              -Having been awake more than 16 hours               -Looking tired               -Eyelid drooping              -Head nodding (it could be too late at this point)              -Driving for more than 30 minutes     Some things you could do to make the driving safer if you are experiencing some drowsiness:              -Stop driving and rest              -Call for transportation              -Make sure your sleep disorder is adequately treated     Some things that have been shown NOT to work when experiencing drowsiness while driving:              -Turning on the radio              -Opening windows              -Eating any  distracting  /  entertaining  foods (e.g., sunflower seeds, candy, or any other)              -Talking on the phone      Your decision may not only impact your life, but also the life of others. Please, remember to drive safe for yourself and all of us.          [facility]  Cognitive Behavioral Therapy for Insomnia       Core Sleep Training Module    Now that you understand a bit more about how sleep works and what causes insomnia, you are ready to begin CBT-I Core Sleep Training.  This process involves five key strategies that will:    Strengthen your sleep drive and circadian sleep rhythm  Strengthen the link between your bed and sleep    It will be important that you continue to keep track of your sleep using your Insomnia  Nile or your paper sleep diary.      Core Sleep Strategies    Much like physical activity and healthy eating strengthens our body, studies show that the following Core strategies are key to achieving stronger, healthier sleep. The focus is on quality of sleep (not quantity) and improving how you feel during the day.     Reduce Your Time  in Bed    Spending extra time in bed trying to get more sleep can cause more sleep disruption and weaken sleep efficiency. Sleep efficiency is simply the percentage of time a person spends asleep while in bed. Normal sleep efficiency is thought to be 85% or greater.  By reducing your time in bed, you will be awake longer during the day leading to quicker and deeper sleep at night. This strategy does not reduce the amount of sleep you get, just the time you are awake in bed.                                             Your provider has recommended the following initial sleep schedule determined by your  estimate of average sleep time over the past two weeks plus 30 minutes.  It also consider the best time for you to sleep.     Your Sleep Schedule Prescription                       Total Time in Bed:  6-7 hours                     Bedtime:  No earlier than 12                      Wake-up Time:  Out of bed every day by 7      Don't go to bed until you feel sleepy (not tired or fatigued)     This helps increase your sleep drive by keeping you awake longer.  If you go to bed when you're not sleepy, it gives your brain the wrong message and can lead to frustration.     If you feel sleepy before your prescribed bedtime, find activities that can help you stay awake until it is time for you to go to bed. Even brief naps in the evening can be very disruptive of sleep at night.      Don't stay in bed unless you are sleeping      If you are unable to fall asleep or return to sleep after about 30 minutes, get out of bed. This helps train your brain that the bed is for sleep. It is harmful to your sleep when you are worried or frustrated in bed. Do not read, eat, worry, think about sleep, use mobile phones or tablets, or watch TV in bed. Do not watch the clock during the night.     Go to another room and do something relaxing. Plan things you can do when you get out of bed. Avoid use of mobile devices or computers when out of  bed.    Return to bed only when you feel sleepy again.  Repeat as often as needed throughout the night.      Get out of bed at the same time every day    Getting up at the same time helps set your biological clock. It is important to stick to your wake time no matter how much sleep you got the night before or how you feel in the morning.    Varying your wake can have the same effect as jet lag.  It disrupts your biological clock and makes you feel more tired and exhausted.  Trying to  catch up  on sleep on the weekends only makes things worse and sets you up for a cycle of poor sleep the following weekdays.      Make sure you set an alarm and keep it away from the bed to prevent looking at the clock during the night.       Avoid daytime napping    Avoid daytime napping if possible. Napping partially fulfills your need for sleep and weakens your sleep drive at night.    However, if you find yourself sleepy (not just tired) you can take a brief 15-30-minute nap during the day if needed.  Naps within 7-8 hours of your prescribed wake time are less likely to affect your sleep the coming night.  Sleepy people make more mistakes and may hurt themselves or others. Therefore, safety trumps all other sleep prescriptions.  Never drive or operate equipment if drowsy or sleepy.     Changing Your Sleep Schedule Prescription    After a week, you can adjust your sleep schedule prescription based on how well you are sleeping. Use the following guidelines to change your prescribed time in bed based on information from your Insomnia  elicia or paper sleep diary.          Increase Time in Bed by 15 Minutes IF:    Your Insomnia  elicia progress tracker estimates that your sleep efficiency has been greater than 90% over the past week (press Progress icon on the home screen and swipe left for this value).    OR you are falling asleep in less than 30 minutes AND awake less than 30 minutes during the night.              Decrease Time  in Bed by 15 Minutes IF:    Your Insomnia  elicia sleep diary progress tracker estimates that your sleep efficiency has been less than 80% over the past week (press Progress icon on the home screen and swipe left for this value).    OR it is taking longer than 30 minutes to fall asleep OR you are awake more than 30 minutes during the night.      DO NOT decrease your sleep schedule below 5.5  hours     Change is Challenging    Research shows that making significant changes in sleep habits improves sleep quality and how you feel. Improvement takes time and is not always steady. Change is challenging and can be stressful.  This is especially true if old habits - like spending more time in bed -- were a way to avoid worry about getting enough sleep.     Your Body mass index is 40.27 kg/m .  Weight management is a personal decision.  If you are interested in exploring weight loss strategies, the following discussion covers the approaches that may be successful. Body mass index (BMI) is one way to tell whether you are at a healthy weight, overweight, or obese. It measures your weight in relation to your height.  A BMI of 18.5 to 24.9 is in the healthy range. A person with a BMI of 25 to 29.9 is considered overweight, and someone with a BMI of 30 or greater is considered obese. More than two-thirds of American adults are considered overweight or obese.  Being overweight or obese increases the risk for further weight gain. Excess weight may lead to heart disease and diabetes.  Creating and following plans for healthy eating and physical activity may help you improve your health.  Weight control is part of healthy lifestyle and includes exercise, emotional health, and healthy eating habits. Careful eating habits lifelong are the mainstay of weight control. Though there are significant health benefits from weight loss, long-term weight loss with diet alone may be very difficult to achieve- studies show long-term success  with dietary management in less than 10% of people. Attaining a healthy weight may be especially difficult to achieve in those with severe obesity. In some cases, medications, devices and surgical management might be considered.  What can you do?  If you are overweight or obese and are interested in methods for weight loss, you should discuss this with your provider.   Consider reducing daily calorie intake by 500 calories.   Keep a food journal.   Avoiding skipping meals, consider cutting portions instead.    Diet combined with exercise helps maintain muscle while optimizing fat loss. Strength training is particularly important for building and maintaining muscle mass. Exercise helps reduce stress, increase energy, and improves fitness. Increasing exercise without diet control, however, may not burn enough calories to loose weight.     Start walking three days a week 10-20 minutes at a time  Work towards walking thirty minutes five days a week   Eventually, increase the speed of your walking for 1-2 minutes at time    In addition, we recommend that you review healthy lifestyles and methods for weight loss available through the National Institutes of Health patient information sites:  http://win.niddk.nih.gov/publications/index.htm    And look into health and wellness programs that may be available through your health insurance provider, employer, local community center, or jann club.

## 2024-03-02 ENCOUNTER — HEALTH MAINTENANCE LETTER (OUTPATIENT)
Age: 59
End: 2024-03-02

## 2024-04-04 ENCOUNTER — ANCILLARY PROCEDURE (OUTPATIENT)
Dept: GENERAL RADIOLOGY | Facility: CLINIC | Age: 59
End: 2024-04-04
Attending: FAMILY MEDICINE
Payer: COMMERCIAL

## 2024-04-04 ENCOUNTER — OFFICE VISIT (OUTPATIENT)
Dept: FAMILY MEDICINE | Facility: CLINIC | Age: 59
End: 2024-04-04
Payer: COMMERCIAL

## 2024-04-04 VITALS
DIASTOLIC BLOOD PRESSURE: 80 MMHG | HEART RATE: 76 BPM | BODY MASS INDEX: 41.82 KG/M2 | SYSTOLIC BLOOD PRESSURE: 144 MMHG | TEMPERATURE: 98.4 F | WEIGHT: 251 LBS | RESPIRATION RATE: 16 BRPM | OXYGEN SATURATION: 97 % | HEIGHT: 65 IN

## 2024-04-04 DIAGNOSIS — M25.561 CHRONIC PAIN OF RIGHT KNEE: Primary | ICD-10-CM

## 2024-04-04 DIAGNOSIS — M25.561 CHRONIC PAIN OF RIGHT KNEE: ICD-10-CM

## 2024-04-04 DIAGNOSIS — I10 PRIMARY HYPERTENSION: ICD-10-CM

## 2024-04-04 DIAGNOSIS — G89.29 CHRONIC PAIN OF RIGHT KNEE: Primary | ICD-10-CM

## 2024-04-04 DIAGNOSIS — G89.29 CHRONIC PAIN OF RIGHT KNEE: ICD-10-CM

## 2024-04-04 PROCEDURE — 73562 X-RAY EXAM OF KNEE 3: CPT | Mod: TC | Performed by: RADIOLOGY

## 2024-04-04 PROCEDURE — 99214 OFFICE O/P EST MOD 30 MIN: CPT | Performed by: FAMILY MEDICINE

## 2024-04-04 RX ORDER — LOSARTAN POTASSIUM 50 MG/1
50 TABLET ORAL DAILY
COMMUNITY
Start: 2024-02-27 | End: 2024-07-11

## 2024-04-04 ASSESSMENT — PAIN SCALES - GENERAL: PAINLEVEL: MILD PAIN (2)

## 2024-04-04 NOTE — PROGRESS NOTES
"  Assessment & Plan     (M25.561,  G89.29) Chronic pain of right knee  (primary encounter diagnosis)  Comment: pt has pain for 2 month.  bear weight, bent hurts. Worse on lateral side. She does not recall of injury but has increased exercise - walking. No recall of injury. Several days ago at home she twisted the knee with stepped on a spider and got scared. Since then the pain got worse. She had to stop walking. Now the pain improved some. She tried to use knee brace that helped.   Exam: right knee mild swelling, with mild tenderness on lateral side. X ray: mild degenerative change. Likely she has OA with knee strain.   Plan: XR Knee Right 3 Views, Physical Therapy          Referral        Advise rest and ice. Otc cbd cream or icyhot patch prn. Will consider mri if pain worse or persists.     (I10) Primary hypertension  Comment: bp elevated at office. Pt state at home bp average is [phone]. But she dose not have her bp machine with her. She is asymptomatic. She takes medication as instructed.   Plan: continue current medication. She will have physical exam next week and will bring her bp machine with her.          BMI  Estimated body mass index is 41.77 kg/m  as calculated from the following:    Height as of this encounter: 1.651 m (5' 5\").    Weight as of this encounter: 113.9 kg (251 lb).   Weight management plan: Discussed healthy diet and exercise guidelines      See Patient Instructions    Subjective   Viviane is a 59 year old, presenting for the following health issues:  Knee Pain (Right knee pain comes and goes for more than a month)        4/4/2024    11:42 AM   Additional Questions   Roomed by Gen PAULA CMA     Knee Pain    History of Present Illness       Reason for visit:  Knee pain  Symptom onset:  More than a month  Symptoms include:  Knee pain and difficulty walking  Symptom intensity:  Moderate  Symptom progression:  Staying the same  Had these symptoms before:  No  What makes it worse:  " "Bumping my foot or knee on something  What makes it better:  Resting my knee, heat and ibuprofen    She eats 4 or more servings of fruits and vegetables daily.She consumes 0 sweetened beverage(s) daily.She exercises with enough effort to increase her heart rate 30 to 60 minutes per day.  She exercises with enough effort to increase her heart rate 6 days per week.   She is taking medications regularly.         Hypertension Follow-up    Do you check your blood pressure regularly outside of the clinic? Yes   Are you following a low salt diet? Yes  Are your blood pressures ever more than 140 on the top number (systolic) OR more   than 90 on the bottom number (diastolic), for example 140/90? No         Review of Systems  Constitutional, HEENT, cardiovascular, pulmonary, gi and gu systems are negative, except as otherwise noted.      Objective    BP (!) 144/80 (BP Location: Right arm, Patient Position: Sitting, Cuff Size: Adult Regular)   Pulse 76   Temp 98.4  F (36.9  C) (Oral)   Resp 16   Ht 1.651 m (5' 5\")   Wt 113.9 kg (251 lb)   LMP  (LMP Unknown)   SpO2 97%   BMI 41.77 kg/m    Body mass index is 41.77 kg/m .  Physical Exam   GENERAL: alert and no distress  NECK: no adenopathy, no asymmetry, masses, or scars  RESP: lungs clear to auscultation - no rales, rhonchi or wheezes  CV: regular rate and rhythm, normal S1 S2, no S3 or S4, no murmur, click or rub, no peripheral edema  ABDOMEN: soft, nontender, no hepatosplenomegaly, no masses and bowel sounds normal  MS: no gross musculoskeletal defects noted, no edema  Bilateral Knees reveal full range of motion, left knee no tenderness, masses, effusion or ligamentous instability. Right knee: mild swelling and tenderness on lateral side. No erythema.       Xray - Reviewed and interpreted by me.  Left knee no fracture. Mild degenerative change.         Signed Electronically by: Roberta Washington MD    "

## 2024-04-10 SDOH — HEALTH STABILITY: PHYSICAL HEALTH: ON AVERAGE, HOW MANY MINUTES DO YOU ENGAGE IN EXERCISE AT THIS LEVEL?: 40 MIN

## 2024-04-10 SDOH — HEALTH STABILITY: PHYSICAL HEALTH: ON AVERAGE, HOW MANY DAYS PER WEEK DO YOU ENGAGE IN MODERATE TO STRENUOUS EXERCISE (LIKE A BRISK WALK)?: 6 DAYS

## 2024-04-10 ASSESSMENT — ACTIVITIES OF DAILY LIVING (ADL)
STIFFNESS: I DO NOT HAVE THE SYMPTOM
GO UP STAIRS: ACTIVITY IS SOMEWHAT DIFFICULT
KNEEL ON THE FRONT OF YOUR KNEE: ACTIVITY IS NOT DIFFICULT
HOW_WOULD_YOU_RATE_THE_CURRENT_FUNCTION_OF_YOUR_KNEE_DURING_YOUR_USUAL_DAILY_ACTIVITIES_ON_A_SCALE_FROM_0_TO_100_WITH_100_BEING_YOUR_LEVEL_OF_KNEE_FUNCTION_PRIOR_TO_YOUR_INJURY_AND_0_BEING_THE_INABILITY_TO_PERFORM_ANY_OF_YOUR_USUAL_DAILY_ACTIVITIES?: 50
PAIN: THE SYMPTOM AFFECTS MY ACTIVITY SLIGHTLY
GO DOWN STAIRS: ACTIVITY IS MINIMALLY DIFFICULT
SQUAT: ACTIVITY IS VERY DIFFICULT
STAND: ACTIVITY IS MINIMALLY DIFFICULT
RISE FROM A CHAIR: ACTIVITY IS MINIMALLY DIFFICULT
GO DOWN STAIRS: ACTIVITY IS MINIMALLY DIFFICULT
KNEE_ACTIVITY_OF_DAILY_LIVING_SUM: 52
PLEASE_INDICATE_YOR_PRIMARY_REASON_FOR_REFERRAL_TO_THERAPY:: KNEE
HOW_WOULD_YOU_RATE_THE_OVERALL_FUNCTION_OF_YOUR_KNEE_DURING_YOUR_USUAL_DAILY_ACTIVITIES?: ABNORMAL
SIT WITH YOUR KNEE BENT: ACTIVITY IS NOT DIFFICULT
SWELLING: I HAVE THE SYMPTOM BUT IT DOES NOT AFFECT MY ACTIVITY
AS_A_RESULT_OF_YOUR_KNEE_INJURY,_HOW_WOULD_YOU_RATE_YOUR_CURRENT_LEVEL_OF_DAILY_ACTIVITY?: SEVERELY ABNORMAL
LIMPING: THE SYMPTOM AFFECTS MY ACTIVITY MODERATELY
STAND: ACTIVITY IS MINIMALLY DIFFICULT
LIMPING: THE SYMPTOM AFFECTS MY ACTIVITY MODERATELY
SQUAT: ACTIVITY IS VERY DIFFICULT
WEAKNESS: I HAVE THE SYMPTOM BUT IT DOES NOT AFFECT MY ACTIVITY
GO UP STAIRS: ACTIVITY IS SOMEWHAT DIFFICULT
PAIN: THE SYMPTOM AFFECTS MY ACTIVITY SLIGHTLY
GIVING WAY, BUCKLING OR SHIFTING OF KNEE: I DO NOT HAVE THE SYMPTOM
STIFFNESS: I DO NOT HAVE THE SYMPTOM
HOW_WOULD_YOU_RATE_THE_CURRENT_FUNCTION_OF_YOUR_KNEE_DURING_YOUR_USUAL_DAILY_ACTIVITIES_ON_A_SCALE_FROM_0_TO_100_WITH_100_BEING_YOUR_LEVEL_OF_KNEE_FUNCTION_PRIOR_TO_YOUR_INJURY_AND_0_BEING_THE_INABILITY_TO_PERFORM_ANY_OF_YOUR_USUAL_DAILY_ACTIVITIES?: 50
WALK: ACTIVITY IS SOMEWHAT DIFFICULT
RISE FROM A CHAIR: ACTIVITY IS MINIMALLY DIFFICULT
RAW_SCORE: 52
SIT WITH YOUR KNEE BENT: ACTIVITY IS NOT DIFFICULT
KNEE_ACTIVITY_OF_DAILY_LIVING_SCORE: 74.29
WEAKNESS: I HAVE THE SYMPTOM BUT IT DOES NOT AFFECT MY ACTIVITY
SWELLING: I HAVE THE SYMPTOM BUT IT DOES NOT AFFECT MY ACTIVITY
HOW_WOULD_YOU_RATE_THE_OVERALL_FUNCTION_OF_YOUR_KNEE_DURING_YOUR_USUAL_DAILY_ACTIVITIES?: ABNORMAL
KNEEL ON THE FRONT OF YOUR KNEE: ACTIVITY IS NOT DIFFICULT
WALK: ACTIVITY IS SOMEWHAT DIFFICULT
GIVING WAY, BUCKLING OR SHIFTING OF KNEE: I DO NOT HAVE THE SYMPTOM
AS_A_RESULT_OF_YOUR_KNEE_INJURY,_HOW_WOULD_YOU_RATE_YOUR_CURRENT_LEVEL_OF_DAILY_ACTIVITY?: SEVERELY ABNORMAL

## 2024-04-10 ASSESSMENT — SOCIAL DETERMINANTS OF HEALTH (SDOH): HOW OFTEN DO YOU GET TOGETHER WITH FRIENDS OR RELATIVES?: MORE THAN THREE TIMES A WEEK

## 2024-04-12 ENCOUNTER — THERAPY VISIT (OUTPATIENT)
Dept: PHYSICAL THERAPY | Facility: REHABILITATION | Age: 59
End: 2024-04-12
Attending: FAMILY MEDICINE
Payer: COMMERCIAL

## 2024-04-12 DIAGNOSIS — G89.29 CHRONIC PAIN OF RIGHT KNEE: ICD-10-CM

## 2024-04-12 DIAGNOSIS — M25.561 CHRONIC PAIN OF RIGHT KNEE: ICD-10-CM

## 2024-04-12 PROCEDURE — 97161 PT EVAL LOW COMPLEX 20 MIN: CPT | Mod: GP | Performed by: PHYSICAL THERAPIST

## 2024-04-12 PROCEDURE — 97110 THERAPEUTIC EXERCISES: CPT | Mod: GP | Performed by: PHYSICAL THERAPIST

## 2024-04-12 NOTE — PROGRESS NOTES
PHYSICAL THERAPY EVALUATION  Type of Visit: Evaluation    See electronic medical record for Abuse and Falls Screening details.    Subjective       Presenting condition or subjective complaint: Knee pain for about two months.  Last July I started some yoga and light upper body weights.  This winter I started riding my indoor bike or nice days taking my dog for a walk, 2-3 miles.  I started this to contol my blood pressure which was high.  This winter I started seeing my BP elevate again so I increased my activity level.  Think I was over doing my activities and noticed a catch in my knee, mostly with walking. Now, where I pushed through the pain, I have difficulty walking and my activity level has decreased.  I stopped all exercise mid March and went to [place].  I had an X-ray which was negative.  The past 10 days I totally rested my knee and it does feel slightly better. The pain is intermittent and increases with bending, like stairs, putting weight on it, and twisting.  Wearing knee brace, rest, and heat has helped decrease pain levels. Denies giving away or locking sensations. Pain is more on the outside. She does wear a knee brace whever she goes out of her house  Date of onset: 02/12/24    Relevant medical history:     Dates & types of surgery:      Prior diagnostic imaging/testing results: X-ray     Prior therapy history for the same diagnosis, illness or injury: No      Prior Level of Function  Transfers:   Ambulation:   ADL:   IADL:     Living Environment  Social support: Alone   Type of home: House; Multi-level   Stairs to enter the home: No       Ramp: No   Stairs inside the home: Yes 12 Is there a railing: Yes   Help at home: None  Equipment owned:       Employment: No    Hobbies/Interests: Hiki    Patient goals for therapy: Exersize    Pain assessment:      Objective   KNEE EVALUATION  PAIN: Pain Level at Rest: 0/10  Pain Level with Use: 2/10  INTEGUMENTARY (edema, incisions):   POSTURE:  pronation B  feet  GAIT:  Weightbearing Status:   Assistive Device(s):   Gait Deviations:  pronates B  BALANCE/PROPRIOCEPTION:  Able to stand on 1 leg B 15+ seconds but glute weakness noted B  WEIGHTBEARING ALIGNMENT:   NON-WEIGHTBEARING ALIGNMENT:   ROM:   (Degrees) Left AROM Left PROM  Right AROM Right PROM   Knee Flexion 135  120, tight    Knee Extension 0  0    Pain:   End feel:     STRENGTH:  WNL L, R: 4+/5, limited anticipating pain  FLEXIBILITY:   SPECIAL TESTS:    Left Right   Apley's (Meniscus)     Audi's (Meniscus) Negative  Negative    Kolby's (ITB/TFL)     Patellar Apprehension Test  Negative    Patella Tracking     Ligamentous Stability  Negative    Anterior Drawer (ACL)  Negative,     Posterior Drawer (PCL)  Negative,     Prone Dial Test at 30 Deg and 90 Deg (PCL/PLC)     Valgus Stress Testing at 0 Deg and 30 Deg   Pain on lateral joint line with pressure of stressing medial.  Pain was not due to hand position   Varus Stress Testing at 0 Deg and 30 Deg   Negative,     No grinding of patella on R or L    FUNCTIONAL TESTS:   PALPATION:  pain in lateral L joint line   JOINT MOBILITY: WNL    Assessment & Plan   CLINICAL IMPRESSIONS  Medical Diagnosis: Chronic R Knee Pain    Treatment Diagnosis: Chronic R Knee pain   Impression/Assessment: Patient is a 59 year old female with left knee pain complaints.  The following significant findings have been identified: Pain, Decreased ROM/flexibility, Decreased strength, Impaired gait, Impaired muscle performance, and Decreased activity tolerance. These impairments interfere with their ability to perform recreational activities, household mobility, and community mobility as compared to previous level of function.     Clinical Decision Making (Complexity):  Clinical Presentation: Stable/Uncomplicated  Clinical Presentation Rationale: based on medical and personal factors listed in PT evaluation  Clinical Decision Making (Complexity): Low complexity    PLAN OF CARE  Treatment  Interventions:  Modalities: Ultrasound  Interventions: Gait Training, Manual Therapy, Neuromuscular Re-education, Therapeutic Activity, Therapeutic Exercise, Self-Care/Home Management    Long Term Goals     PT Goal 1  Goal Identifier: stairs  Goal Description: Pt will be able to step through going up and down stairs with pain levels 0-1/10  Target Date: 07/11/24  PT Goal 2  Goal Identifier: walk  Goal Description: Pt will be able to resume walking her dog 2-3 miles  Target Date: 07/11/24  PT Goal 3  Goal Identifier: hiking  Goal Description: Pt will be able to resume hiking 2-3 miles on uneven surfaces with pain levels 0-1/10  Target Date: 07/11/24      Frequency of Treatment: 1x/week  Duration of Treatment: 10 visits    Recommended Referrals to Other Professionals:   Education Assessment:   Learner/Method: Patient    Risks and benefits of evaluation/treatment have been explained.   Patient/Family/caregiver agrees with Plan of Care.     Evaluation Time:     PT Eval, Low Complexity Minutes (81206): 20       Signing Clinician: Dory Mason PT

## 2024-04-17 ENCOUNTER — THERAPY VISIT (OUTPATIENT)
Dept: PHYSICAL THERAPY | Facility: REHABILITATION | Age: 59
End: 2024-04-17
Payer: COMMERCIAL

## 2024-04-17 DIAGNOSIS — G89.29 CHRONIC PAIN OF RIGHT KNEE: Primary | ICD-10-CM

## 2024-04-17 DIAGNOSIS — M25.561 CHRONIC PAIN OF RIGHT KNEE: Primary | ICD-10-CM

## 2024-04-17 PROCEDURE — 97110 THERAPEUTIC EXERCISES: CPT | Mod: GP | Performed by: PHYSICAL THERAPIST

## 2024-04-19 ENCOUNTER — TELEPHONE (OUTPATIENT)
Dept: SLEEP MEDICINE | Facility: CLINIC | Age: 59
End: 2024-04-19
Payer: COMMERCIAL

## 2024-04-19 DIAGNOSIS — R06.83 SNORES: Primary | ICD-10-CM

## 2024-04-19 DIAGNOSIS — Z72.820 LACK OF ADEQUATE SLEEP: ICD-10-CM

## 2024-04-19 DIAGNOSIS — R53.81 MALAISE AND FATIGUE: ICD-10-CM

## 2024-04-19 DIAGNOSIS — R06.89 DYSPNEA AND RESPIRATORY ABNORMALITY: ICD-10-CM

## 2024-04-19 DIAGNOSIS — E66.813 CLASS 3 SEVERE OBESITY DUE TO EXCESS CALORIES WITH BODY MASS INDEX (BMI) OF 40.0 TO 44.9 IN ADULT, UNSPECIFIED WHETHER SERIOUS COMORBIDITY PRESENT (H): ICD-10-CM

## 2024-04-19 DIAGNOSIS — R06.00 DYSPNEA AND RESPIRATORY ABNORMALITY: ICD-10-CM

## 2024-04-19 DIAGNOSIS — R53.83 MALAISE AND FATIGUE: ICD-10-CM

## 2024-04-19 DIAGNOSIS — E66.01 CLASS 3 SEVERE OBESITY DUE TO EXCESS CALORIES WITH BODY MASS INDEX (BMI) OF 40.0 TO 44.9 IN ADULT, UNSPECIFIED WHETHER SERIOUS COMORBIDITY PRESENT (H): ICD-10-CM

## 2024-04-19 NOTE — TELEPHONE ENCOUNTER
Left message for patient to call and see if she would either like to move her HST  to [place] or if she would rather do a watchpat. Does not need follow up moved just hst.    Haleigh Tran [facility], HST Specialist  [place] / [facility]

## 2024-04-19 NOTE — TELEPHONE ENCOUNTER
Spoke to pt and she is willing to do a watch pat. Information sent and appointment changed.    Haleigh Tran CMA, HST Specialist  [place] / [facility]

## 2024-04-23 ASSESSMENT — SLEEP AND FATIGUE QUESTIONNAIRES

## 2024-04-25 ENCOUNTER — THERAPY VISIT (OUTPATIENT)
Dept: PHYSICAL THERAPY | Facility: REHABILITATION | Age: 59
End: 2024-04-25
Payer: COMMERCIAL

## 2024-04-25 DIAGNOSIS — G89.29 CHRONIC PAIN OF RIGHT KNEE: Primary | ICD-10-CM

## 2024-04-25 DIAGNOSIS — M25.561 CHRONIC PAIN OF RIGHT KNEE: Primary | ICD-10-CM

## 2024-04-25 PROCEDURE — 97110 THERAPEUTIC EXERCISES: CPT | Mod: GP | Performed by: PHYSICAL THERAPIST

## 2024-04-26 ENCOUNTER — VIRTUAL VISIT (OUTPATIENT)
Dept: SLEEP MEDICINE | Facility: CLINIC | Age: 59
End: 2024-04-26
Payer: COMMERCIAL

## 2024-04-26 DIAGNOSIS — R53.83 MALAISE AND FATIGUE: ICD-10-CM

## 2024-04-26 DIAGNOSIS — R53.81 MALAISE AND FATIGUE: ICD-10-CM

## 2024-04-26 DIAGNOSIS — E66.01 CLASS 3 SEVERE OBESITY DUE TO EXCESS CALORIES WITH BODY MASS INDEX (BMI) OF 40.0 TO 44.9 IN ADULT, UNSPECIFIED WHETHER SERIOUS COMORBIDITY PRESENT (H): ICD-10-CM

## 2024-04-26 DIAGNOSIS — R06.83 SNORES: ICD-10-CM

## 2024-04-26 DIAGNOSIS — Z72.820 LACK OF ADEQUATE SLEEP: ICD-10-CM

## 2024-04-26 DIAGNOSIS — R06.89 DYSPNEA AND RESPIRATORY ABNORMALITY: ICD-10-CM

## 2024-04-26 DIAGNOSIS — E66.813 CLASS 3 SEVERE OBESITY DUE TO EXCESS CALORIES WITH BODY MASS INDEX (BMI) OF 40.0 TO 44.9 IN ADULT, UNSPECIFIED WHETHER SERIOUS COMORBIDITY PRESENT (H): ICD-10-CM

## 2024-04-26 DIAGNOSIS — R06.00 DYSPNEA AND RESPIRATORY ABNORMALITY: ICD-10-CM

## 2024-04-26 NOTE — PROGRESS NOTES
Device has been registered and shipped via [employer] on 04/26/24. Patient was notified that package was mailed out.

## 2024-05-01 ENCOUNTER — THERAPY VISIT (OUTPATIENT)
Dept: PHYSICAL THERAPY | Facility: REHABILITATION | Age: 59
End: 2024-05-01
Payer: COMMERCIAL

## 2024-05-01 DIAGNOSIS — M25.561 CHRONIC PAIN OF RIGHT KNEE: Primary | ICD-10-CM

## 2024-05-01 DIAGNOSIS — G89.29 CHRONIC PAIN OF RIGHT KNEE: Primary | ICD-10-CM

## 2024-05-01 PROCEDURE — 97110 THERAPEUTIC EXERCISES: CPT | Mod: GP | Performed by: PHYSICAL THERAPIST

## 2024-05-05 PROCEDURE — 95800 SLP STDY UNATTENDED: CPT | Performed by: INTERNAL MEDICINE

## 2024-05-07 NOTE — PROGRESS NOTES
Watch Pat has been scored using rule 1B, 4%.  Patient to follow up with provider to determine appropriate therapy.    PAT AHI: 7.9    Ordering Provider: DELICIA Varela

## 2024-05-08 ENCOUNTER — THERAPY VISIT (OUTPATIENT)
Dept: PHYSICAL THERAPY | Facility: REHABILITATION | Age: 59
End: 2024-05-08
Payer: COMMERCIAL

## 2024-05-08 DIAGNOSIS — G89.29 CHRONIC PAIN OF RIGHT KNEE: Primary | ICD-10-CM

## 2024-05-08 DIAGNOSIS — M25.561 CHRONIC PAIN OF RIGHT KNEE: Primary | ICD-10-CM

## 2024-05-08 PROBLEM — Z85.038 HISTORY OF COLON CANCER: Chronic | Status: ACTIVE | Noted: 2023-01-09

## 2024-05-08 PROBLEM — I10 HYPERTENSION: Chronic | Status: ACTIVE | Noted: 2024-05-08

## 2024-05-08 PROCEDURE — 97110 THERAPEUTIC EXERCISES: CPT | Mod: GP | Performed by: PHYSICAL THERAPIST

## 2024-05-08 NOTE — PROCEDURES
"WatchPAT - HOME SLEEP STUDY INTERPRETATION    Patient: Charo Leung  MRN: [id number]  YOB: 1965  Study Date: 5/5/2024  Referring Provider: Roberta Washington;   Ordering Provider: Judy Castillo PA-C    Chain of custody patient verification was not enabled.        Indications for Home Study: Charo Leung is a 59 year old female who presents with symptoms suggestive of obstructive sleep apnea.    Estimated body mass index is 41.77 kg/m  as calculated from the following:    Height as of 4/4/24: 1.651 m (5' 5\").    Weight as of 4/4/24: 113.9 kg (251 lb).  Total score - [place]: 4 (4/23/2024  5:35 PM)  Total Score: 4 (4/23/2024  5:35 PM)    Data: A full night home sleep study was performed recording the standard physiologic parameters including peripheral arterial tonometry (PAT), sound/snoring, body position,  movement, sound, and oxygen saturation by pulse oximetry. Pulse rate was estimated by oximetry recording. Sleep staging (wake, REM, light, and deep sleep) was derived from PAT signal.  This study was considered adequate based on > 4 hours of quality oximetry and respiratory recording. As specified by the AASM Manual for the Scoring of Sleep and Associated events, version 2.3, Rule VIII.D 1B, 4% oxygen desaturation scoring for hypopneas is used as a standard of care on all home sleep apnea testing.    Total Recording Time: 8 hrs, 29 min  Total Sleep Time: 7 hrs, 13 min  % of Sleep Time REM: 21%    Respiratory:  Respiratory events: The PAT respiratory disturbance index [pRDI] was 13 events per hour.  The PAT apnea/hypopnea index [pAHI] was 7.9 events per hour.  LISA was 6.7 events per hour.  During REM sleep the pAHI was 27.6.  Sleep Associated Hypoxemia: sustained hypoxemia was not present. Mean oxygen saturation was 92%.  Minimum was 84%.  Time with saturation less than 88% was 7 minutes.    Heart Rate: By pulse oximetry bradycardia was noted.     Position: Percent of time spent: supine - 63%, " prone - 29%, on right - 7%, on left - 0%.  pAHI was 10 per hour supine, 2 per hour prone, 7 per hour on right side, and n/a per hour on left side.     Assessment:   Mild obstructive sleep apnea.  Sleep associated hypoxemia was present.    Recommendations:  Consider auto-CPAP at 5-15 cmH2O, oral appliance therapy, polysomnography with full night PAP titration, or surgical options.  Suggest optimizing sleep hygiene and avoiding sleep deprivation.  Weight management.    Diagnosis Code(s): Obstructive Sleep Apnea G47.33, Hypoxemia G47.36    Chase Krause MD, May 8, 2024   Diplomate, American Board of Internal Medicine, Sleep Medicine

## 2024-05-13 NOTE — LETTER
6/22/2023         RE: Charo Leung  [address] [address]        Dear Colleague,    Thank you for referring your patient, Charo Leung, to the [facility] [place]. Please see a copy of my visit note below.     is a 58 years old lady status post excision of forehead lipoma.  She is 4 weeks out from surgery.    At the physical exam, incision is healing well.  No sign of hypertrophic scarring or keloid.                Plan: Continue to apply cocoa butter lotion over the scar, continue with scar massage, avoid some burning, utilize sunscreen protection and follow-up with me as needed.  Patient is happy with results.    Gal Orellana MD , FACS   Diplomate American Board of Plastic Surgery  Diplomate American Board of Surgery  Adj. Assistant Professor of Surgery  Division of Plastic & Reconstructive Surgery   [facility]  Office: [phone]   6/22/2023 at 2:56 PM             Again, thank you for allowing me to participate in the care of your patient.        Sincerely,        Gal Orellana MD     <-- Click to add NO significant Past Surgical History

## 2024-05-15 ENCOUNTER — THERAPY VISIT (OUTPATIENT)
Dept: PHYSICAL THERAPY | Facility: REHABILITATION | Age: 59
End: 2024-05-15
Payer: COMMERCIAL

## 2024-05-15 DIAGNOSIS — G89.29 CHRONIC PAIN OF RIGHT KNEE: Primary | ICD-10-CM

## 2024-05-15 DIAGNOSIS — M25.561 CHRONIC PAIN OF RIGHT KNEE: Primary | ICD-10-CM

## 2024-05-15 PROCEDURE — 97110 THERAPEUTIC EXERCISES: CPT | Mod: GP | Performed by: PHYSICAL THERAPIST

## 2024-05-19 ASSESSMENT — SLEEP AND FATIGUE QUESTIONNAIRES
HOW LIKELY ARE YOU TO NOD OFF OR FALL ASLEEP WHILE WATCHING TV: SLIGHT CHANCE OF DOZING
HOW LIKELY ARE YOU TO NOD OFF OR FALL ASLEEP WHILE SITTING INACTIVE IN A PUBLIC PLACE: WOULD NEVER DOZE
HOW LIKELY ARE YOU TO NOD OFF OR FALL ASLEEP IN A CAR, WHILE STOPPED FOR A FEW MINUTES IN TRAFFIC: WOULD NEVER DOZE
HOW LIKELY ARE YOU TO NOD OFF OR FALL ASLEEP WHILE SITTING QUIETLY AFTER LUNCH WITHOUT ALCOHOL: WOULD NEVER DOZE
HOW LIKELY ARE YOU TO NOD OFF OR FALL ASLEEP WHEN YOU ARE A PASSENGER IN A CAR FOR AN HOUR WITHOUT A BREAK: WOULD NEVER DOZE
HOW LIKELY ARE YOU TO NOD OFF OR FALL ASLEEP WHILE SITTING AND READING: SLIGHT CHANCE OF DOZING
HOW LIKELY ARE YOU TO NOD OFF OR FALL ASLEEP WHILE LYING DOWN TO REST IN THE AFTERNOON WHEN CIRCUMSTANCES PERMIT: MODERATE CHANCE OF DOZING
HOW LIKELY ARE YOU TO NOD OFF OR FALL ASLEEP WHILE SITTING AND TALKING TO SOMEONE: WOULD NEVER DOZE

## 2024-05-21 ENCOUNTER — ALLIED HEALTH/NURSE VISIT (OUTPATIENT)
Dept: FAMILY MEDICINE | Facility: CLINIC | Age: 59
End: 2024-05-21
Payer: COMMERCIAL

## 2024-05-21 DIAGNOSIS — Z23 ENCOUNTER FOR IMMUNIZATION: Primary | ICD-10-CM

## 2024-05-21 PROCEDURE — 90471 IMMUNIZATION ADMIN: CPT | Performed by: FAMILY MEDICINE

## 2024-05-21 PROCEDURE — 99207 PR NO CHARGE NURSE ONLY: CPT | Performed by: FAMILY MEDICINE

## 2024-05-21 PROCEDURE — 90750 HZV VACC RECOMBINANT IM: CPT | Performed by: FAMILY MEDICINE

## 2024-05-21 NOTE — PROGRESS NOTES
Prior to immunization administration, verified patients identity using patient s name and date of birth. Please see Immunization Activity for additional information.     Screening Questionnaire for Adult Immunization    Are you sick today?   No   Do you have allergies to medications, food, a vaccine component or latex?   Yes   Have you ever had a serious reaction after receiving a vaccination?   No   Do you have a long-term health problem with heart, lung, kidney, or metabolic disease (e.g., diabetes), asthma, a blood disorder, no spleen, complement component deficiency, a cochlear implant, or a spinal fluid leak?  Are you on long-term aspirin therapy?   No   Do you have cancer, leukemia, HIV/AIDS, or any other immune system problem?   No   Do you have a parent, brother, or sister with an immune system problem?   No   In the past 3 months, have you taken medications that affect  your immune system, such as prednisone, other steroids, or anticancer drugs; drugs for the treatment of rheumatoid arthritis, Crohn s disease, or psoriasis; or have you had radiation treatments?   No   Have you had a seizure, or a brain or other nervous system problem?   No   During the past year, have you received a transfusion of blood or blood    products, or been given immune (gamma) globulin or antiviral drug?   No   For women: Are you pregnant or is there a chance you could become       pregnant during the next month?   No   Have you received any vaccinations in the past 4 weeks?   No     Immunization questionnaire answers were all negative.    I have reviewed the following standing orders:   This patient is due and qualifies for the Zoster vaccine.    Click here for Zoster Standing Order    I have reviewed the vaccines inclusion and exclusion criteria; No concerns regarding eligibility.     Patient instructed to remain in clinic for 15 minutes afterwards, and to report any adverse reactions.     Screening performed by Nelly MONTERROSO  [place] on 5/21/2024 at 9:16 AM.

## 2024-05-22 ENCOUNTER — VIRTUAL VISIT (OUTPATIENT)
Dept: SLEEP MEDICINE | Facility: CLINIC | Age: 59
End: 2024-05-22
Payer: COMMERCIAL

## 2024-05-22 VITALS
BODY MASS INDEX: 41.48 KG/M2 | DIASTOLIC BLOOD PRESSURE: 71 MMHG | WEIGHT: 243 LBS | SYSTOLIC BLOOD PRESSURE: 121 MMHG | HEIGHT: 64 IN

## 2024-05-22 DIAGNOSIS — G47.33 OBSTRUCTIVE SLEEP APNEA: Primary | ICD-10-CM

## 2024-05-22 PROCEDURE — 99213 OFFICE O/P EST LOW 20 MIN: CPT | Mod: 95 | Performed by: PHYSICIAN ASSISTANT

## 2024-05-22 ASSESSMENT — PAIN SCALES - GENERAL: PAINLEVEL: MILD PAIN (2)

## 2024-05-22 NOTE — PATIENT INSTRUCTIONS
[facility]  Search engine: https://mms.aadsm.org/members/directory/search_bootstrap.php?org_id=ADSM&   Certified in Dental Sleep Medicine    Duarte Cecilia  Degree: HUSEYIN  [address]  [address]  [place] [address]  Professional Phone: [phone]  Website: [email or web address]    bAbe Chen  Degree: HUSEYIN  [facility]  [address]  [address]  [place] [address]  Professional Phone: [phone]: [phone]    [facility]  [address] [place] [address]  [address]  [place] [address]  Appointments: [phone]  Fax: [phone]    Iwona Soni  [facility]  [address]  [place] [address]  Professional Phone: [phone]  Website: [email or web address]      Mynor Shepard   [facility]   Degree: MD HUSEYIN   [address]  [address]  [place] [address]  Appointments: [phone]    Seb Ramirez  Degree: HUSEYIN  [address]  [address]  [place] [address]  Professional Phone: [phone]  Fax: [phone]    Kemal Honeycutt  Degree: HUSEYIN  [facility]  [address]  [address]  [place] [address]  Professional Phone: [phone]  Website: [email or web address]      Kamila Heck  [facility]-you should verify insurance coverage  [address]  [address]  [place] [address]  Professional Phone: [phone]  Website: [email or web address]      Chrisitna Cespedes--DOES NOT ACCEPT INSURANCE  Degree: HUSEYIN--you should verify insurance coverage  [facility]  [address]  [address]  [place] [address]  Professional Phone: [phone]    Mandy Bhatia  Degree: HUSEYIN, PhD  [facility]  [address] [place] [address]   Appointments: [phone]   Fax: [phone]     Leonidas  [facility]  Degree: DDS  [address] [place] [address]  Professional Phone: [phone]  Fax: [phone]  Website: [email or web address]      Duc Muller  Degree: DDS  [facility]  [address] [place] [address]    Mariona Mulet Pradera  Degree: DDS, MS  [facility]  [address] [place] [address]  Professional Phone: [phone]      Elisabeth Finnegan  Degree: DDS, MS  [facility]  [address]  [address]  [place] [address]  Professional Phone: [phone]    Brianda Vivas  Degree: SAMINAS  [facility]  [address] [place] [address]  Appointments: [phone]    Rafy Michael  Degree: DDS  [facility]  [address] [place] [address]  Professional Phone: [phone]  Website: [email or web address]      Med Reed  Degree: DDS  [facility]  [address] [place] [address]  Professional Phone: [phone]  Fax: [phone]      Giovanny Mcclendon  Degree: DDS  [facility]  [address]  [address]  [place] [address]    Aaron Vizcaino   Degree: DDS   [facility]   [address]   [address]  [place] [address]  Phone [phone]  Website: [email or web address]    Winsome Patricio   Degree: DDS   [address]  [address]  [place] [address]  Appointments: [phone]    Joanna Nichols   Degree: DDS   [address] [place] [address]   Appointments: [phone]    Danny العلي   Degree: DDS   [address] [place] [address]   Appointments: [phone]    Vitaly Lozada   Degree: DDS   [address] [place] [address]   Appointments: [phone]    Bk Garrett   Degree: HUSEYIN [facility], [address] [address]   Appointments: [phone]    Garima Kingsley   Degree: HUSEYIN  [address] [place] [address]  [place] [address]  Appointments: [phone]    Danny Carrell   [address] [place] [address]  Appointments: [phone]    Radha Alexander   Degree: DDS   [facility]   [address] [place] [address]   Appointments: [phone]    Niels Lopez   Degree: HUSEYIN   [address] [place] [address]   Appointments: [phone]    [facility]    Degree: HUSEYIN  [address]  [address]  [place] [address]  Appointments: [phone]    Francis Sauceda   Degree: HUSEYIN   [facility]   [address]  [address]  [place] [address]  Appointment: [phone]    Chelsea Girl   Degree: MS CRYSTAL, FAAKASI   [facility]  [address]   [address]  [place] [address]  Appointments: [phone]    Mynor Francis   Degree: HUSEYIN   [address]   [place] [address]   Appointments: [phone]   Fax: [phone]        ACCEPT MEDICARE  Luisito Carbajal DDS  [address], [place] [address]  [phone]; [phone] (fax)  [employer]    Donald Dickson DDS, MS   [employer] [facility] [place] [address]   [address]   [place] [address]   Appointments: [phone]   Fax: [phone]     Osiel Frederick   Degree: DMD, MSD   [facility]   [address]  [address]  [place] [address]  Appointments: [phone]  Fax: [phone]      ADDITIONAL PROVIDERS    Aaron Issa DDS    [facility]  [address] [place] [address]  Appointments: [phone]     [facility]  [address]  [address]  [place] [address]  Appointments: [phone]  Fax- [phone]    Anita Askew   Degree: HUSEYIN   [facility]    [address]  [place] [address]  Appointments: [phone]     Your Body mass index is 41.71 kg/m .  Weight management is a personal decision.  If you are  interested in exploring weight loss strategies, the following discussion covers the approaches that may be successful. Body mass index (BMI) is one way to tell whether you are at a healthy weight, overweight, or obese. It measures your weight in relation to your height.  A BMI of 18.5 to 24.9 is in the healthy range. A person with a BMI of 25 to 29.9 is considered overweight, and someone with a BMI of 30 or greater is considered obese. More than two-thirds of American adults are considered overweight or obese.  Being overweight or obese increases the risk for further weight gain. Excess weight may lead to heart disease and diabetes.  Creating and following plans for healthy eating and physical activity may help you improve your health.  Weight control is part of healthy lifestyle and includes exercise, emotional health, and healthy eating habits. Careful eating habits lifelong are the mainstay of weight control. Though there are significant health benefits from weight loss, long-term weight loss with diet alone may be very difficult to achieve- studies show long-term success with dietary management in less than 10% of people. Attaining a healthy weight may be especially difficult to achieve in those with severe obesity. In some cases, medications, devices and surgical management might be considered.  What can you do?  If you are overweight or obese and are interested in methods for weight loss, you should discuss this with your provider.   Consider reducing daily calorie intake by 500 calories.   Keep a food journal.   Avoiding skipping meals, consider cutting portions instead.    Diet combined with exercise helps maintain muscle while optimizing fat loss. Strength training is particularly important for building and maintaining muscle mass. Exercise helps reduce stress, increase energy, and improves fitness. Increasing exercise without diet control, however, may not burn enough calories to loose weight.     Start  walking three days a week 10-20 minutes at a time  Work towards walking thirty minutes five days a week   Eventually, increase the speed of your walking for 1-2 minutes at time    In addition, we recommend that you review healthy lifestyles and methods for weight loss available through the National Institutes of Health patient information sites:  http://win.niddk.nih.gov/publications/index.htm    And look into health and wellness programs that may be available through your health insurance provider, employer, local community center, or jann club.

## 2024-05-22 NOTE — NURSING NOTE
Is the patient currently in the state of [place]? YES    Visit mode:VIDEO    If the visit is dropped, the patient can be reconnected by: VIDEO VISIT: Send to e-mail at: [email or web address]    Will anyone else be joining the visit? NO  (If patient encounters technical issues they should call [phone]    How would you like to obtain your AVS? MyChart    Are changes needed to the allergy or medication list? No    Are refills needed on medications prescribed by this physician? NO    Reason for visit: RECHREAGAN ROSARIO

## 2024-05-22 NOTE — PROGRESS NOTES
"Virtual Visit Details    Type of service:  Video Visit     Originating Location (pt. Location): Home    Distant Location (provider location):  On-site  Platform used for Video Visit: Freepath    Home Sleep Apnea Testing Results Visit:    Chief Complaint   Patient presents with    RECHECK    Study Results       Charo Leung is a 59 year old female who returns to [facility] after having had Home Sleep Apnea Testing. She presented with snoring, non-refreshing sleep, daytime fatigue/sleepiness (ESS 4), difficulty maintaining sleep and bruxism. The STOP-BANG score is 3-4/8.     Home Sleep Apnea Testing (WatchPAT) : 243 lbs 0 oz: pAHI 8/hr. LISA 6, Supine AHI 10/hr.   Oxygen Jorge Luis of 84%.  Baseline 92%.  Sp02 =< 88% for 7 minutes  She slept on her back (63%), prone (7%), left (NA%) and right (7%) sides.   Analysis time: 7 hours and 13 minutes.     Charo Leung reports that she slept Fair .     Past medical/surgical history, family history, social history, medications and allergies were reviewed.      /71   Ht 1.626 m (5' 4\")   Wt 110.2 kg (243 lb)   LMP  (LMP Unknown)   BMI 41.71 kg/m      Impression/Plan:  Mild Obstructive Sleep Apnea.   Sleep associated hypoxemia was present.    Home Sleep Apnea Testing was reviewed in detail today with Charo and a copy given to her for her records.  Treatment options discussed today including  auto-CPAP at 6-16 cmH2O, oral appliance therapy, positional therapy, polysomnography with full night PAP titration, or surgical options.    Elected treatment with oral appliance therapy.  Sleep Dental referral.  WatchPAT to re-evaluate once the patient is using MAD    20 minutes spent on day of encounter doing chart review,  history and exam, counseling, coordinating plan of care, documentation and further activities as noted above.       Judy Castillo PA-C  Sleep Medicine          "

## 2024-06-05 ENCOUNTER — THERAPY VISIT (OUTPATIENT)
Dept: PHYSICAL THERAPY | Facility: REHABILITATION | Age: 59
End: 2024-06-05
Payer: COMMERCIAL

## 2024-06-05 DIAGNOSIS — G89.29 CHRONIC PAIN OF RIGHT KNEE: Primary | ICD-10-CM

## 2024-06-05 DIAGNOSIS — M25.561 CHRONIC PAIN OF RIGHT KNEE: Primary | ICD-10-CM

## 2024-06-05 PROCEDURE — 97110 THERAPEUTIC EXERCISES: CPT | Mod: GP | Performed by: PHYSICAL THERAPIST

## 2024-06-05 PROCEDURE — 97140 MANUAL THERAPY 1/> REGIONS: CPT | Mod: GP | Performed by: PHYSICAL THERAPIST

## 2024-06-11 ENCOUNTER — THERAPY VISIT (OUTPATIENT)
Dept: PHYSICAL THERAPY | Facility: REHABILITATION | Age: 59
End: 2024-06-11
Payer: COMMERCIAL

## 2024-06-11 DIAGNOSIS — M25.561 CHRONIC PAIN OF RIGHT KNEE: Primary | ICD-10-CM

## 2024-06-11 DIAGNOSIS — G89.29 CHRONIC PAIN OF RIGHT KNEE: Primary | ICD-10-CM

## 2024-06-11 PROCEDURE — 97140 MANUAL THERAPY 1/> REGIONS: CPT | Mod: GP | Performed by: PHYSICAL THERAPIST

## 2024-06-11 PROCEDURE — 97110 THERAPEUTIC EXERCISES: CPT | Mod: GP | Performed by: PHYSICAL THERAPIST

## 2024-06-20 ENCOUNTER — THERAPY VISIT (OUTPATIENT)
Dept: PHYSICAL THERAPY | Facility: REHABILITATION | Age: 59
End: 2024-06-20
Payer: COMMERCIAL

## 2024-06-20 DIAGNOSIS — M25.561 CHRONIC PAIN OF RIGHT KNEE: Primary | ICD-10-CM

## 2024-06-20 DIAGNOSIS — G89.29 CHRONIC PAIN OF RIGHT KNEE: Primary | ICD-10-CM

## 2024-06-20 PROCEDURE — 97140 MANUAL THERAPY 1/> REGIONS: CPT | Mod: GP | Performed by: PHYSICAL THERAPIST

## 2024-06-20 PROCEDURE — 97110 THERAPEUTIC EXERCISES: CPT | Mod: GP | Performed by: PHYSICAL THERAPIST

## 2024-07-10 ENCOUNTER — THERAPY VISIT (OUTPATIENT)
Dept: PHYSICAL THERAPY | Facility: REHABILITATION | Age: 59
End: 2024-07-10
Payer: COMMERCIAL

## 2024-07-10 DIAGNOSIS — G89.29 CHRONIC PAIN OF RIGHT KNEE: Primary | ICD-10-CM

## 2024-07-10 DIAGNOSIS — M25.561 CHRONIC PAIN OF RIGHT KNEE: Primary | ICD-10-CM

## 2024-07-10 PROCEDURE — 97110 THERAPEUTIC EXERCISES: CPT | Mod: GP | Performed by: PHYSICAL THERAPIST

## 2024-07-10 SDOH — HEALTH STABILITY: PHYSICAL HEALTH: ON AVERAGE, HOW MANY DAYS PER WEEK DO YOU ENGAGE IN MODERATE TO STRENUOUS EXERCISE (LIKE A BRISK WALK)?: 6 DAYS

## 2024-07-10 SDOH — HEALTH STABILITY: PHYSICAL HEALTH: ON AVERAGE, HOW MANY MINUTES DO YOU ENGAGE IN EXERCISE AT THIS LEVEL?: 40 MIN

## 2024-07-10 ASSESSMENT — ACTIVITIES OF DAILY LIVING (ADL)
SWELLING: I DO NOT HAVE THE SYMPTOM
PAIN: I HAVE THE SYMPTOM BUT IT DOES NOT AFFECT MY ACTIVITY
SQUAT: ACTIVITY IS SOMEWHAT DIFFICULT
STIFFNESS: I HAVE THE SYMPTOM BUT IT DOES NOT AFFECT MY ACTIVITY
KNEEL ON THE FRONT OF YOUR KNEE: ACTIVITY IS MINIMALLY DIFFICULT
KNEE_ACTIVITY_OF_DAILY_LIVING_SCORE: 84.29
GIVING WAY, BUCKLING OR SHIFTING OF KNEE: I DO NOT HAVE THE SYMPTOM
WALK: ACTIVITY IS MINIMALLY DIFFICULT
RISE FROM A CHAIR: ACTIVITY IS MINIMALLY DIFFICULT
GO UP STAIRS: ACTIVITY IS MINIMALLY DIFFICULT
AS_A_RESULT_OF_YOUR_KNEE_INJURY,_HOW_WOULD_YOU_RATE_YOUR_CURRENT_LEVEL_OF_DAILY_ACTIVITY?: NEARLY NORMAL
RAW_SCORE: 59
GO DOWN STAIRS: ACTIVITY IS SOMEWHAT DIFFICULT
KNEE_ACTIVITY_OF_DAILY_LIVING_SUM: 59
LIMPING: I DO NOT HAVE THE SYMPTOM
HOW_WOULD_YOU_RATE_THE_CURRENT_FUNCTION_OF_YOUR_KNEE_DURING_YOUR_USUAL_DAILY_ACTIVITIES_ON_A_SCALE_FROM_0_TO_100_WITH_100_BEING_YOUR_LEVEL_OF_KNEE_FUNCTION_PRIOR_TO_YOUR_INJURY_AND_0_BEING_THE_INABILITY_TO_PERFORM_ANY_OF_YOUR_USUAL_DAILY_ACTIVITIES?: 75
WEAKNESS: I HAVE THE SYMPTOM BUT IT DOES NOT AFFECT MY ACTIVITY
HOW_WOULD_YOU_RATE_THE_OVERALL_FUNCTION_OF_YOUR_KNEE_DURING_YOUR_USUAL_DAILY_ACTIVITIES?: NEARLY NORMAL
STAND: ACTIVITY IS NOT DIFFICULT
SIT WITH YOUR KNEE BENT: ACTIVITY IS NOT DIFFICULT

## 2024-07-10 ASSESSMENT — SOCIAL DETERMINANTS OF HEALTH (SDOH): HOW OFTEN DO YOU GET TOGETHER WITH FRIENDS OR RELATIVES?: MORE THAN THREE TIMES A WEEK

## 2024-07-11 ENCOUNTER — OFFICE VISIT (OUTPATIENT)
Dept: FAMILY MEDICINE | Facility: CLINIC | Age: 59
End: 2024-07-11
Payer: COMMERCIAL

## 2024-07-11 VITALS
WEIGHT: 248 LBS | RESPIRATION RATE: 20 BRPM | SYSTOLIC BLOOD PRESSURE: 117 MMHG | HEIGHT: 65 IN | TEMPERATURE: 98.5 F | DIASTOLIC BLOOD PRESSURE: 80 MMHG | HEART RATE: 70 BPM | OXYGEN SATURATION: 98 % | BODY MASS INDEX: 41.32 KG/M2

## 2024-07-11 DIAGNOSIS — Z00.00 ROUTINE GENERAL MEDICAL EXAMINATION AT A HEALTH CARE FACILITY: Primary | ICD-10-CM

## 2024-07-11 DIAGNOSIS — E66.01 MORBID OBESITY (H): Chronic | ICD-10-CM

## 2024-07-11 DIAGNOSIS — H93.12 TINNITUS OF LEFT EAR: ICD-10-CM

## 2024-07-11 DIAGNOSIS — E03.9 HYPOTHYROIDISM, UNSPECIFIED TYPE: ICD-10-CM

## 2024-07-11 DIAGNOSIS — I10 PRIMARY HYPERTENSION: ICD-10-CM

## 2024-07-11 DIAGNOSIS — E78.2 MIXED HYPERLIPIDEMIA: ICD-10-CM

## 2024-07-11 DIAGNOSIS — H61.22 IMPACTED CERUMEN OF LEFT EAR: ICD-10-CM

## 2024-07-11 LAB
ALBUMIN SERPL BCG-MCNC: 4.5 G/DL (ref 3.5–5.2)
ALP SERPL-CCNC: 61 U/L (ref 40–150)
ALT SERPL W P-5'-P-CCNC: 24 U/L (ref 0–50)
ANION GAP SERPL CALCULATED.3IONS-SCNC: 10 MMOL/L (ref 7–15)
AST SERPL W P-5'-P-CCNC: 24 U/L (ref 0–45)
BILIRUB SERPL-MCNC: 0.6 MG/DL
BUN SERPL-MCNC: 13.3 MG/DL (ref 8–23)
CALCIUM SERPL-MCNC: 9.8 MG/DL (ref 8.8–10.2)
CHLORIDE SERPL-SCNC: 104 MMOL/L (ref 98–107)
CHOLEST SERPL-MCNC: 255 MG/DL
CREAT SERPL-MCNC: 0.75 MG/DL (ref 0.51–0.95)
DEPRECATED HCO3 PLAS-SCNC: 27 MMOL/L (ref 22–29)
EGFRCR SERPLBLD CKD-EPI 2021: >90 ML/MIN/1.73M2
ERYTHROCYTE [DISTWIDTH] IN BLOOD BY AUTOMATED COUNT: 13.3 % (ref 10–15)
FASTING STATUS PATIENT QL REPORTED: ABNORMAL
FASTING STATUS PATIENT QL REPORTED: ABNORMAL
GLUCOSE SERPL-MCNC: 107 MG/DL (ref 70–99)
HCT VFR BLD AUTO: 43.8 % (ref 35–47)
HDLC SERPL-MCNC: 61 MG/DL
HGB BLD-MCNC: 14.4 G/DL (ref 11.7–15.7)
LDLC SERPL CALC-MCNC: 171 MG/DL
MCH RBC QN AUTO: 30.7 PG (ref 26.5–33)
MCHC RBC AUTO-ENTMCNC: 32.9 G/DL (ref 31.5–36.5)
MCV RBC AUTO: 93 FL (ref 78–100)
NONHDLC SERPL-MCNC: 194 MG/DL
PLATELET # BLD AUTO: 244 10E3/UL (ref 150–450)
POTASSIUM SERPL-SCNC: 4.7 MMOL/L (ref 3.4–5.3)
PROT SERPL-MCNC: 7.5 G/DL (ref 6.4–8.3)
RBC # BLD AUTO: 4.69 10E6/UL (ref 3.8–5.2)
SODIUM SERPL-SCNC: 141 MMOL/L (ref 135–145)
T4 FREE SERPL-MCNC: 1.51 NG/DL (ref 0.9–1.7)
TRIGL SERPL-MCNC: 115 MG/DL
TSH SERPL DL<=0.005 MIU/L-ACNC: 0.27 UIU/ML (ref 0.3–4.2)
WBC # BLD AUTO: 5.8 10E3/UL (ref 4–11)

## 2024-07-11 PROCEDURE — 80053 COMPREHEN METABOLIC PANEL: CPT | Performed by: FAMILY MEDICINE

## 2024-07-11 PROCEDURE — 99396 PREV VISIT EST AGE 40-64: CPT | Performed by: FAMILY MEDICINE

## 2024-07-11 PROCEDURE — 99214 OFFICE O/P EST MOD 30 MIN: CPT | Mod: 25 | Performed by: FAMILY MEDICINE

## 2024-07-11 PROCEDURE — 84443 ASSAY THYROID STIM HORMONE: CPT | Performed by: FAMILY MEDICINE

## 2024-07-11 PROCEDURE — 80061 LIPID PANEL: CPT | Performed by: FAMILY MEDICINE

## 2024-07-11 PROCEDURE — 85027 COMPLETE CBC AUTOMATED: CPT | Performed by: FAMILY MEDICINE

## 2024-07-11 PROCEDURE — 36415 COLL VENOUS BLD VENIPUNCTURE: CPT | Performed by: FAMILY MEDICINE

## 2024-07-11 PROCEDURE — 84439 ASSAY OF FREE THYROXINE: CPT | Performed by: FAMILY MEDICINE

## 2024-07-11 RX ORDER — LEVOTHYROXINE SODIUM 137 UG/1
TABLET ORAL
Qty: 90 TABLET | Refills: 3 | Status: SHIPPED | OUTPATIENT
Start: 2024-07-11 | End: 2024-09-26

## 2024-07-11 RX ORDER — LOSARTAN POTASSIUM 25 MG/1
25 TABLET ORAL DAILY
Qty: 90 TABLET | Refills: 3 | Status: SHIPPED | OUTPATIENT
Start: 2024-07-11

## 2024-07-11 NOTE — PROGRESS NOTES
Preventive Care Visit  [facility]  Roberta [place] MD, Family Medicine  Jul 11, 2024      Assessment & Plan     (Z00.00) Routine general medical examination at a health care facility  (primary encounter diagnosis)  Comment: encourage annual pe and vaccine up date  Plan: TSH WITH FREE T4 REFLEX, Lipid panel reflex to         direct LDL Fasting, Comprehensive metabolic         panel (BMP + Alb, Alk Phos, ALT, AST, Total.         Bili, TP), CBC with platelets            (E78.2) Mixed hyperlipidemia  Comment: pt has borderline elevated 10 years ascvd risk score of 6.4%.   Plan: will follow-up lab. Advise regular exercise and low fat diet    (E03.9) Hypothyroidism, unspecified type  Comment: doing well with medication.   Plan: levothyroxine (SYNTHROID/LEVOTHROID) 137 MCG         tablet        Continue current medication.     (I10) Primary hypertension  Comment: bp check by using our machine is 132/82. By her was 149/80 then 117/80 . Pt state she check bp at home regularly. She was taking losartan 50 mg, bp improved but after several weeks bp got low to 96/60. She felt dizziness, so she started to take half tablet, bp around 110-120/70-80. Denies cp, sob, palpitation, headache and blurry vision   Plan: losartan (COZAAR) 25 MG tablet        Continue losartan 25 mg. Close monitor bp at home. If bp is < 100/60 she will hold it.     (H93.12) Tinnitus of left ear  Comment: pt felt left ear ringing for years. Mild, only noticed at quiet night, it dose not bother her. Denies hearing loss, ear pain.   Plan: Adult Audiology  Referral        Will have audiology testing.     (H61.22) Impacted cerumen of left ear  Comment: exam: left ear wax impacted  Plan: removed the wax.     (E66.01) Morbid obesity (H)  Comment: bmi 41 and failed to improve.   Plan: advise regular exercise and health diet to loss weight.     Patient has been advised of split billing requirements and indicates understanding:  Yes        Counseling  Appropriate preventive services were discussed with this patient, including applicable screening as appropriate for fall prevention, nutrition, physical activity, Tobacco-use cessation, weight loss and cognition.  Checklist reviewing preventive services available has been given to the patient.  Reviewed patient's diet, addressing concerns and/or questions.       See Patient Instructions    Enzo Pan is a 59 year old, presenting for the following:  Physical (fasting)        7/11/2024     8:37 AM   Additional Questions   Roomed by Gen PAULA CMA        Health Care Directive  Patient does not have a Health Care Directive or Living Will: Discussed advance care planning with patient; however, patient declined at this time.    HPI      Hyperlipidemia Follow-Up    Are you regularly taking any medication or supplement to lower your cholesterol?   No  Are you having muscle aches or other side effects that you think could be caused by your cholesterol lowering medication?  No    Hypertension Follow-up    Do you check your blood pressure regularly outside of the clinic? Yes   Are you following a low salt diet? Yes  Are your blood pressures ever more than 140 on the top number (systolic) OR more   than 90 on the bottom number (diastolic), for example 140/90? No    Hypothyroidism Follow-up    Since last visit, patient describes the following symptoms: Weight stable, no hair loss, no skin changes, no constipation, no loose stools        7/10/2024   General Health   How would you rate your overall physical health? Good   Feel stress (tense, anxious, or unable to sleep) Rather much      (!) STRESS CONCERN      7/10/2024   Nutrition   Three or more servings of calcium each day? Yes   Diet: Regular (no restrictions)   How many servings of fruit and vegetables per day? 4 or more   How many sweetened beverages each day? 0-1            7/10/2024   Exercise   Days per week of moderate/strenous exercise 6 days    Average minutes spent exercising at this level 40 min            7/10/2024   Social Factors   Frequency of gathering with friends or relatives More than three times a week   Worry food won't last until get money to buy more No   Food not last or not have enough money for food? No   Do you have housing? (Housing is defined as stable permanent housing and does not include staying ouside in a car, in a tent, in an abandoned building, in an overnight shelter, or couch-surfing.) Yes   Are you worried about losing your housing? No   Lack of transportation? No   Unable to get utilities (heat,electricity)? No            7/10/2024   Fall Risk   Fallen 2 or more times in the past year? No   Trouble with walking or balance? No             7/10/2024   Dental   Dentist two times every year? Yes            4/10/2024   TB Screening   Were you born outside of the [place]? No              Today's PHQ-2 Score:       2024    10:04 AM   PHQ-2 (  [employer])   Q1: Little interest or pleasure in doing things 0   Q2: Feeling down, depressed or hopeless 0   PHQ-2 Score 0         7/10/2024   Substance Use   Alcohol more than 3/day or more than 7/wk No   Do you use any other substances recreationally? (!) ALCOHOL        Social History     Tobacco Use    Smoking status: Former     Current packs/day: 0.00     Average packs/day: 0.5 packs/day for 24.2 years (12.1 ttl pk-yrs)     Types: Cigarettes     Start date: 1980     Quit date: 2004     Years since quittin.9     Passive exposure: Never    Smokeless tobacco: Never   Vaping Use    Vaping status: Never Used   Substance Use Topics    Alcohol use: Yes     Comment: Occasional    Drug use: No           2023   LAST FHS-7 RESULTS   1st degree relative breast or ovarian cancer No   Any relative bilateral breast cancer No   Any male have breast cancer No   Any ONE woman have BOTH breast AND ovarian cancer No   Any woman with breast cancer before 50yrs No   2 or more relatives with  "breast AND/OR ovarian cancer Yes   2 or more relatives with breast AND/OR bowel cancer Yes           Mammogram Screening - Mammogram every 1-2 years updated in Health Maintenance based on mutual decision making        7/10/2024   STI Screening   New sexual partner(s) since last STI/HIV test? No        History of abnormal Pap smear: Status post hysterectomy with removal of cervix and no history of CIN2 or greater or cervical cancer. Health Maintenance and Surgical History updated.       ASCVD Risk   The 10-year ASCVD risk score (Uday EDWARDS, et al., 2019) is: 7.5%    Values used to calculate the score:      Age: 59 years      Sex: Female      Is Non- : No      Diabetic: No      Tobacco smoker: No      Systolic Blood Pressure: 149 mmHg      Is BP treated: Yes      HDL Cholesterol: 55 mg/dL      Total Cholesterol: 269 mg/dL           Reviewed and updated as needed this visit by Provider   Tobacco  Allergies  Meds  Problems  Med Hx  Surg Hx  Fam Hx            Lab work is in process  Labs reviewed in EPIC      Review of Systems  Constitutional, HEENT, cardiovascular, pulmonary, GI, , musculoskeletal, neuro, skin, endocrine and psych systems are negative, except as otherwise noted.     Objective    Exam  BP (!) 149/80 (BP Location: Left arm, Patient Position: Sitting, Cuff Size: Adult Regular)   Pulse 70   Temp 98.5  F (36.9  C) (Oral)   Resp 20   Ht 1.651 m (5' 5\")   Wt 112.5 kg (248 lb)   LMP  (LMP Unknown)   SpO2 98%   BMI 41.27 kg/m     Estimated body mass index is 41.27 kg/m  as calculated from the following:    Height as of this encounter: 1.651 m (5' 5\").    Weight as of this encounter: 112.5 kg (248 lb).    Physical Exam  GENERAL: alert and no distress  EYES: Eyes grossly normal to inspection, PERRL and conjunctivae and sclerae normal  HENT: ear canals and TM's normal, nose and mouth without ulcers or lesions  NECK: no adenopathy, no asymmetry, masses, or " scars  RESP: lungs clear to auscultation - no rales, rhonchi or wheezes  BREAST: normal without masses, tenderness or nipple discharge and no palpable axillary masses or adenopathy  CV: regular rate and rhythm, normal S1 S2, no S3 or S4, no murmur, click or rub, no peripheral edema  ABDOMEN: soft, nontender, no hepatosplenomegaly, no masses and bowel sounds normal  MS: no gross musculoskeletal defects noted, no edema  SKIN: no suspicious lesions or rashes  NEURO: Normal strength and tone, mentation intact and speech normal  PSYCH: mentation appears normal, affect normal/bright        Signed Electronically by: Roberta [place] MD

## 2024-07-11 NOTE — PATIENT INSTRUCTIONS
Patient Education   Preventive Care Advice   This is general advice given by our system to help you stay healthy. However, your care team may have specific advice just for you. Please talk to your care team about your preventive care needs.  Nutrition  Eat 5 or more servings of fruits and vegetables each day.  Try wheat bread, brown rice and whole grain pasta (instead of white bread, rice, and pasta).  Get enough calcium and vitamin D. Check the label on foods and aim for 100% of the RDA (recommended daily allowance).  Lifestyle  Exercise at least 150 minutes each week  (30 minutes a day, 5 days a week).  Do muscle strengthening activities 2 days a week. These help control your weight and prevent disease.  No smoking.  Wear sunscreen to prevent skin cancer.  Have a dental exam and cleaning every 6 months.  Yearly exams  See your health care team every year to talk about:  Any changes in your health.  Any medicines your care team has prescribed.  Preventive care, family planning, and ways to prevent chronic diseases.  Shots (vaccines)   HPV shots (up to age 26), if you've never had them before.  Hepatitis B shots (up to age 59), if you've never had them before.  COVID-19 shot: Get this shot when it's due.  Flu shot: Get a flu shot every year.  Tetanus shot: Get a tetanus shot every 10 years.  Pneumococcal, hepatitis A, and RSV shots: Ask your care team if you need these based on your risk.  Shingles shot (for age 50 and up)  General health tests  Diabetes screening:  Starting at age 35, Get screened for diabetes at least every 3 years.  If you are younger than age 35, ask your care team if you should be screened for diabetes.  Cholesterol test: At age 39, start having a cholesterol test every 5 years, or more often if advised.  Bone density scan (DEXA): At age 50, ask your care team if you should have this scan for osteoporosis (brittle bones).  Hepatitis C: Get tested at least once in your life.  STIs (sexually  transmitted infections)  Before age 24: Ask your care team if you should be screened for STIs.  After age 24: Get screened for STIs if you're at risk. You are at risk for STIs (including HIV) if:  You are sexually active with more than one person.  You don't use condoms every time.  You or a partner was diagnosed with a sexually transmitted infection.  If you are at risk for HIV, ask about PrEP medicine to prevent HIV.  Get tested for HIV at least once in your life, whether you are at risk for HIV or not.  Cancer screening tests  Cervical cancer screening: If you have a cervix, begin getting regular cervical cancer screening tests starting at age 21.  Breast cancer scan (mammogram): If you've ever had breasts, begin having regular mammograms starting at age 40. This is a scan to check for breast cancer.  Colon cancer screening: It is important to start screening for colon cancer at age 45.  Have a colonoscopy test every 10 years (or more often if you're at risk) Or, ask your provider about stool tests like a FIT test every year or Cologuard test every 3 years.  To learn more about your testing options, visit:   .  For help making a decision, visit:   https://bit.ly/hy32698.  Prostate cancer screening test: If you have a prostate, ask your care team if a prostate cancer screening test (PSA) at age 55 is right for you.  Lung cancer screening: If you are a current or former smoker ages 50 to 80, ask your care team if ongoing lung cancer screenings are right for you.  For informational purposes only. Not to replace the advice of your health care provider. Copyright   2023 [facility]. All rights reserved. Clinically reviewed by the [facility] Transitions Program. [employer] [id number] - REV 01/24.  Learning About Stress  What is stress?     Stress is your body's response to a hard situation. Your body can have a physical, emotional, or mental response. Stress is a fact of life for most people, and it  affects everyone differently. What causes stress for you may not be stressful for someone else.  A lot of things can cause stress. You may feel stress when you go on a job interview, take a test, or run a race. This kind of short-term stress is normal and even useful. It can help you if you need to work hard or react quickly. For example, stress can help you finish an important job on time.  Long-term stress is caused by ongoing stressful situations or events. Examples of long-term stress include long-term health problems, ongoing problems at work, or conflicts in your family. Long-term stress can harm your health.  How does stress affect your health?  When you are stressed, your body responds as though you are in danger. It makes hormones that speed up your heart, make you breathe faster, and give you a burst of energy. This is called the fight-or-flight stress response. If the stress is over quickly, your body goes back to normal and no harm is done.  But if stress happens too often or lasts too long, it can have bad effects. Long-term stress can make you more likely to get sick, and it can make symptoms of some diseases worse. If you tense up when you are stressed, you may develop neck, shoulder, or low back pain. Stress is linked to high blood pressure and heart disease.  Stress also harms your emotional health. It can make you mckeon, tense, or depressed. Your relationships may suffer, and you may not do well at work or school.  What can you do to manage stress?  You can try these things to help manage stress:   Do something active. Exercise or activity can help reduce stress. Walking is a great way to get started. Even everyday activities such as housecleaning or yard work can help.  Try yoga or nikolay chi. These techniques combine exercise and meditation. You may need some training at first to learn them.  Do something you enjoy. For example, listen to music or go to a movie. Practice your hobby or do volunteer  "work.  Meditate. This can help you relax, because you are not worrying about what happened before or what may happen in the future.  Do guided imagery. Imagine yourself in any setting that helps you feel calm. You can use online videos, books, or a teacher to guide you.  Do breathing exercises. For example:  From a standing position, bend forward from the waist with your knees slightly bent. Let your arms dangle close to the floor.  Breathe in slowly and deeply as you return to a standing position. Roll up slowly and lift your head last.  Hold your breath for just a few seconds in the standing position.  Breathe out slowly and bend forward from the waist.  Let your feelings out. Talk, laugh, cry, and express anger when you need to. Talking with supportive friends or family, a counselor, or a juliette leader about your feelings is a healthy way to relieve stress. Avoid discussing your feelings with people who make you feel worse.  Write. It may help to write about things that are bothering you. This helps you find out how much stress you feel and what is causing it. When you know this, you can find better ways to cope.  What can you do to prevent stress?  You might try some of these things to help prevent stress:  Manage your time. This helps you find time to do the things you want and need to do.  Get enough sleep. Your body recovers from the stresses of the day while you are sleeping.  Get support. Your family, friends, and community can make a difference in how you experience stress.  Limit your news feed. Avoid or limit time on social media or news that may make you feel stressed.  Do something active. Exercise or activity can help reduce stress. Walking is a great way to get started.  Where can you learn more?  Go to https://www.healthwise.net/patiented  Enter N032 in the search box to learn more about \"Learning About Stress.\"  Current as of: October 24, 2023               Content Version: 14.0    2567-3403 " Healthwise, [employer].   Care instructions adapted under license by your healthcare professional. If you have questions about a medical condition or this instruction, always ask your healthcare professional. [employer] disclaims any warranty or liability for your use of this information.      Substance Use Disorder: Care Instructions  Overview     You can improve your life and health by stopping your use of alcohol or drugs. When you don't drink or use drugs, you may feel and sleep better. You may get along better with your family, friends, and coworkers. There are medicines and programs that can help with substance use disorder.  How can you care for yourself at home?  Here are some ways to help you stay sober and prevent relapse.  If you have been given medicine to help keep you sober or reduce your cravings, be sure to take it exactly as prescribed.  Talk to your doctor about programs that can help you stop using drugs or drinking alcohol.  Do not keep alcohol or drugs in your home.  Plan ahead. Think about what you'll say if other people ask you to drink or use drugs. Try not to spend time with people who drink or use drugs.  Use the time and money spent on drinking or drugs to do something that's important to you.  Preventing a relapse  Have a plan to deal with relapse. Learn to recognize changes in your thinking that lead you to drink or use drugs. Get help before you start to drink or use drugs again.  Try to stay away from situations, friends, or places that may lead you to drink or use drugs.  If you feel the need to drink alcohol or use drugs again, seek help right away. Call a trusted friend or family member. Some people get support from organizations such as Narcotics Anonymous or [employer] or from treatment facilities.  If you relapse, get help as soon as you can. Some people make a plan with another person that outlines what they want that person to do for them if they relapse.  The plan usually includes how to handle the relapse and who to notify in case of relapse.  Don't give up. Remember that a relapse doesn't mean that you have failed. Use the experience to learn the triggers that lead you to drink or use drugs. Then quit again. Recovery is a lifelong process. Many people have several relapses before they are able to quit for good.  Follow-up care is a key part of your treatment and safety. Be sure to make and go to all appointments, and call your doctor if you are having problems. It's also a good idea to know your test results and keep a list of the medicines you take.  When should you call for help?   Call 911  anytime you think you may need emergency care. For example, call if you or someone else:    Has overdosed or has withdrawal signs. Be sure to tell the emergency workers that you are or someone else is using or trying to quit using drugs. Overdose or withdrawal signs may include:  Losing consciousness.  Seizure.  Seeing or hearing things that aren't there (hallucinations).     Is thinking or talking about suicide or harming others.   Where to get help 24 hours a day, 7 days a week   If you or someone you know talks about suicide, self-harm, a mental health crisis, a substance use crisis, or any other kind of emotional distress, get help right away. You can:    Call the Suicide and Crisis Lifeline at [facility].     Call 2-689-470-BRRD (1-811.540.4467).     Text HOME to 228793 to access the Crisis Text Line.   Consider saving these numbers in your phone.  Go to [email or web address] for more information or to chat online.  Call your doctor now or seek immediate medical care if:    You are having withdrawal symptoms. These may include nausea or vomiting, sweating, shakiness, and anxiety.   Watch closely for changes in your health, and be sure to contact your doctor if:    You have a relapse.     You need more help or support to stop.   Where can you learn more?  Go to  "https://www.healthNavigating [facility].net/patiented  Enter H573 in the search box to learn more about \"Substance Use Disorder: Care Instructions.\"  Current as of: November 15, 2023               Content Version: 14.0    3878-9135 Healthwise, [employer].   Care instructions adapted under license by your healthcare professional. If you have questions about a medical condition or this instruction, always ask your healthcare professional. Healthwise, [employer] disclaims any warranty or liability for your use of this information.         "

## 2024-07-12 ENCOUNTER — TELEPHONE (OUTPATIENT)
Dept: FAMILY MEDICINE | Facility: CLINIC | Age: 59
End: 2024-07-12
Payer: COMMERCIAL

## 2024-07-12 NOTE — TELEPHONE ENCOUNTER
----- Message from Roberta Washington sent at 7/12/2024  8:18 AM CDT -----  Please call pt for lab results     1) low TSH with normal free t4. Likely the dose of your synthroid is little too high. Will continue same dose and have an office follow-up visit in 3 month to monitor.   2)  normal kidney and liver function. Normal electrolytes. Normal blood cell count.  3) mild elevated fasting glucose and advise diet control.   4) elevated LDL that is bad cholesterol. Advise exercise and low fat diet. 10 years ascvd risk score is 4.2% and no medication is recommended at this moment. Will monitor.     The 10-year ASCVD risk score (Uday DK, et al., 2019) is: 4.2%    Values used to calculate the score:      Age: 59 years      Sex: Female      Is Non- : No      Diabetic: No      Tobacco smoker: No      Systolic Blood Pressure: 117 mmHg      Is BP treated: Yes      HDL Cholesterol: 61 mg/dL      Total Cholesterol: 255 mg/dL    Roberta Washington MD on 7/12/2024 at 8:17 AM;

## 2024-07-12 NOTE — TELEPHONE ENCOUNTER
Left message to return call. If patient calls back, please route to [facility].     TCs, please send to RNs.    Chelsea Adams RN  [facility]

## 2024-07-15 NOTE — TELEPHONE ENCOUNTER
Left message to return call. If patient calls back, please route to [facility].     TCs, please send to RNs.    Chelsea Adams RN  [facility]

## 2024-07-17 NOTE — TELEPHONE ENCOUNTER
Spoke with patient to review provider message. She verbalized understanding and will make an appointment closer to the required time.    Chelsea Adams RN  [facility]

## 2024-07-31 ENCOUNTER — OFFICE VISIT (OUTPATIENT)
Dept: AUDIOLOGY | Facility: CLINIC | Age: 59
End: 2024-07-31
Attending: FAMILY MEDICINE
Payer: COMMERCIAL

## 2024-07-31 DIAGNOSIS — H91.93 BILATERAL HEARING LOSS, UNSPECIFIED HEARING LOSS TYPE: Primary | ICD-10-CM

## 2024-07-31 DIAGNOSIS — H93.12 TINNITUS OF LEFT EAR: ICD-10-CM

## 2024-07-31 PROCEDURE — 92550 TYMPANOMETRY & REFLEX THRESH: CPT | Mod: 52 | Performed by: AUDIOLOGIST

## 2024-07-31 PROCEDURE — 92557 COMPREHENSIVE HEARING TEST: CPT | Performed by: AUDIOLOGIST

## 2024-07-31 NOTE — PROGRESS NOTES
AUDIOLOGY REPORT    SUBJECTIVE:  Charo Leung is a 59 year old female who was seen in the Audiology Clinic at the [facility] for audiologic evaluation, referred by Roberta Washington M.D. The patient reported occasional, left-sided tinnitus, which occurs in quiet situations, especially at night (sounds like a faint siren). The tinnitus is not pulsatile or debilitating, and improved recently following cerumen removal from the left ear. She denied hearing loss, vertigo, otalgia, otorrhea, recent illness, history of noise exposure or aural fullness. Health history is positive for primary hypertension and mixed hyperlipidemia.    OBJECTIVE:  Abuse Screening:  Do you feel unsafe at home or work/school? No  Do you feel threatened by someone? No  Does anyone try to keep you from having contact with others, or doing things outside of your home? No  Physical signs of abuse present? No     Fall Risk Screen:  1. Have you fallen two or more times in the past year? No  2. Have you fallen and had an injury in the past year? No    Otoscopic exam indicates ears are clear of cerumen, bilaterally.     Pure Tone Thresholds assessed using conventional audiometry with good  reliability from 250-8000 Hz bilaterally using insert earphones and circumaural headphones.     RIGHT:  normal sloping to moderate, likely sensorineural hearing loss affecting 8000 Hz only    LEFT:    normal sloping to mild, likely sensorineural hearing loss affecting 3840-4620 Hz    Tympanogram:    RIGHT: normal eardrum mobility    LEFT:   normal eardrum mobility    Reflexes for 1000 Hz (reported by stimulus ear):  RIGHT: Ipsilateral is present at normal levels  RIGHT: Contralateral could not be assessed due to equipment issues  LEFT:   Ipsilateral is present at normal levels  LEFT:   Contralateral could not be assessed due to equipment issues    Speech Reception Threshold:    RIGHT: 10 dB HL    LEFT:   15 dB HL  Word Recognition Score:      RIGHT: 100% at 50 dB HL using NU-6 recorded word list.    LEFT:   100% at 50 dB HL using NU-6 recorded word list.      ASSESSMENT:     ICD-10-CM    1. Bilateral hearing loss, unspecified hearing loss type  H91.93       2. Tinnitus of left ear  H93.12 Adult Audiology  Referral          Today s results were discussed with the patient in detail. Common tinnitus triggers and management strategies were discussed at length.    PLAN:  Ms. Leung should return for hearing evaluation per medical management or patient concern. Wear hearing protection consistently in noise to preserve residual hearing sensitivity and to minimize the effects of noise on tinnitus. Ms. Leung expressed verbal understanding of this information and plan. Please call this clinic with questions regarding these results or recommendations.        Lia Gomes, [facility]  [place] Licensed Audiologist [id number]

## 2024-08-23 ENCOUNTER — TELEPHONE (OUTPATIENT)
Dept: FAMILY MEDICINE | Facility: CLINIC | Age: 59
End: 2024-08-23
Payer: COMMERCIAL

## 2024-08-23 NOTE — TELEPHONE ENCOUNTER
Order/Referral Request    Who is requesting: patient     Orders being requested: labs to check thyroid levels again     Reason service is needed/diagnosis: overactive thyroid levels at last physical     When are orders needed by: at earliest convenience     Has this been discussed with Provider: Yes    Does patient have a preference on a Group/Provider/Facility? NA     Does patient have an appointment scheduled?: Yes: Patient wants a lab appointment for next week     Where to send orders: Place orders within Epic    Could we send this information to you in [employer] or would you prefer to receive a phone call?:   Patient would prefer a phone call   Okay to leave a detailed message?: Yes at Cell number on file:    Telephone Information:   Mobile [phone]

## 2024-08-23 NOTE — TELEPHONE ENCOUNTER
Appt scheduled. Pt would like thyroid recheck sooner than October due to symptoms she is having.    7/12/24 lab result:  1) low TSH with normal free t4. Likely the dose of your synthroid is little too high. Will continue same dose and have an office follow-up visit in 3 month to monitor.

## 2024-09-04 NOTE — PROGRESS NOTES
DISCHARGE  Reason for Discharge: see note below    Equipment Issued:     Discharge Plan: Patient to continue home program.    Referring Provider:  Roberta Washington       07/10/24 0500   Appointment Info   Signing clinician's name / credentials Dory Mason, PT   Visits Used 10   Medical Diagnosis Chronic R Knee Pain   PT Tx Diagnosis Chronic R Knee pain   Progress Note/Certification   Onset of illness/injury or Date of Surgery 02/12/24   Therapy Frequency 1x/week   Predicted Duration 10 visits   PT Goal 1   Goal Identifier stairs   Goal Description Pt will be able to step through going up and down stairs with pain levels 0-1/10   Goal Progress goal met   Target Date 07/11/24   Date Met 07/10/24   PT Goal 2   Goal Identifier walk   Goal Description Pt will be able to resume walking her dog 2-3 miles   Goal Progress met.  Pt has resumed walking her dog at least 2 miles   Target Date 07/11/24   Date Met 07/10/24   PT Goal 3   Goal Identifier hiking   Goal Description Pt will be able to resume hiking 2-3 miles on uneven surfaces with pain levels 0-1/10   Goal Progress partially met: can hike on even surfaces but has not tried uneven surfaces.  She has done hills which went okay   Target Date 07/11/24   Subjective Report   Subjective Report I have been doing really well.  I was able to hike with no difficulty, more on even type surfaces and I felt I could have kept going and no pain the next day. Down stairs can still be a challenge.  I am more aware of my pace as I walk and doing my best to not revert to limping as I don't feel I need to limp.  I make corrections as I need it. Stretching has really helped my mobility   Objective Measure 1   Objective Measure Knee outcome: 74.29   Details knee outcome: 84.29 on 7/10/24   Objective Measure 2   Objective Measure no pain to palpation   Details AROM = to other leg   Therapeutic Procedure/Exercise   Therapeutic Procedures: strength, endurance, ROM, flexibility minutes  "([id number]) 25   Ther Proc 1 upright bike for warm up, 5 min/ seat 4-today  (not today)   Ther Proc 1 - Details stretches: sitting hamstring, gastroc and soleus against wall and drop off step, hold 30 seconds X 1-3 reps, B   Ther Proc 2 - Details quad and hamstring set in sitting hold 5-10 seconds X 5-10 reps.-   Ther Proc 3 supine SLR X 20, B with 3# weight-today   Ther Proc 3 - Details sidelying hip abd X 20, B, 3# B   Ther Proc 4 clamshells X 20, B-3#   Ther Proc 4 - Details sitting hip isometric adduction squeezing pillow, hold 10 seconds x 6, start mod pressure   Ther Proc 5 standing marching with high knees: hold 3s X 10, alt   Ther Proc 5 - Details heel and toe raises X 20   Ther Proc 6 - Details walk heel to toe   Ther Proc 7 prone hip extension 3# weight X 20 B   Ther Proc 7 - Details sideleying hip adduction, B, no weight X 10 3#-today   Ther Proc 8 mini-squat X 10-today   Ther Proc 8 - Details step up on 6\" step X 10, B, working up to 20-today   Ther Proc 9 retro step on 6\" step X 5, working up to 20, B-today   Ther Proc 9 - Details EDU: gradually working back into prescribed strengthening exercises   Ther Proc 10 Edu on muscles, palpation of R knee, edu on current muscles effected and determined POC   Skilled Intervention goal review, knee outcome, discussion on exercises, what to do when, pt's progression with activities, ice, walking mechanics   Patient Response/Progress pt feels she can manage symptoms when they occur   Manual Therapy   Patient Response/Progress *   Education   Learner/Method Patient   Plan   Plan for next session hold chart for 30 days.  If pt has not scheduled her chart will be discharged.  Pt is in agreement with this plan   Comments   Comments Pt returns and feels her knee is much improved.  Feel the last flare-up was a muscle strain that has resolved.  She knows what exercises to do when, does yoga daily and rides bike daily.  She feels most of her goals have been met and will try to " manage on her own.  If something changes she will call and schedule.   Total Session Time   Timed Code Treatment Minutes 25   Total Treatment Time (sum of timed and untimed services) 25

## 2024-09-11 ENCOUNTER — OFFICE VISIT (OUTPATIENT)
Dept: FAMILY MEDICINE | Facility: CLINIC | Age: 59
End: 2024-09-11
Payer: COMMERCIAL

## 2024-09-11 VITALS
BODY MASS INDEX: 41.32 KG/M2 | DIASTOLIC BLOOD PRESSURE: 84 MMHG | RESPIRATION RATE: 20 BRPM | HEIGHT: 65 IN | TEMPERATURE: 97.8 F | OXYGEN SATURATION: 98 % | WEIGHT: 248 LBS | SYSTOLIC BLOOD PRESSURE: 132 MMHG | HEART RATE: 79 BPM

## 2024-09-11 DIAGNOSIS — E04.1 THYROID NODULE: ICD-10-CM

## 2024-09-11 DIAGNOSIS — E03.9 HYPOTHYROIDISM, UNSPECIFIED TYPE: Primary | Chronic | ICD-10-CM

## 2024-09-11 DIAGNOSIS — E78.49 OTHER HYPERLIPIDEMIA: Chronic | ICD-10-CM

## 2024-09-11 DIAGNOSIS — E66.01 MORBID OBESITY (H): Chronic | ICD-10-CM

## 2024-09-11 DIAGNOSIS — I10 PRIMARY HYPERTENSION: Chronic | ICD-10-CM

## 2024-09-11 PROCEDURE — 90673 RIV3 VACCINE NO PRESERV IM: CPT | Performed by: FAMILY MEDICINE

## 2024-09-11 PROCEDURE — 90471 IMMUNIZATION ADMIN: CPT | Performed by: FAMILY MEDICINE

## 2024-09-11 PROCEDURE — 84443 ASSAY THYROID STIM HORMONE: CPT | Performed by: FAMILY MEDICINE

## 2024-09-11 PROCEDURE — 99214 OFFICE O/P EST MOD 30 MIN: CPT | Mod: 25 | Performed by: FAMILY MEDICINE

## 2024-09-11 PROCEDURE — 36415 COLL VENOUS BLD VENIPUNCTURE: CPT | Performed by: FAMILY MEDICINE

## 2024-09-11 NOTE — PROGRESS NOTES
Assessment & Plan     (E03.9) Hypothyroidism, unspecified type  (primary encounter diagnosis)  Comment: pt takes synthroid as instructed and no side effect. Recently she feels anxious, hair loss, fatigue. She is sale her house to her son's family and she will move to her [place] cabin.   Plan: TSH with free T4 reflex        Will follow-up lab. Continue current medication.     (E04.1) Thyroid nodule  Comment: pt had right thyroid nodule and removed at 2014.   Plan: US Thyroid        Will follow-up ultrasound.     (E78.49) Other hyperlipidemia  Comment: we reviewed her elevated ldl. Her 10 years ascvd risk score is 4.2% so no medication is recommended at this moment.   Plan: strongly advise regular exercise and low fat diet. Will monitor    (E66.01) Morbid obesity (H)  Comment: bmi 41 and failed to improve.   Plan: advise loss weight by exercise and health diet.     (I10) Primary hypertension  Comment: bp reasonable controlled. No medication side effect.   Plan: continue current medication and advise home bp monitor. If bp is < 100/60 she will hold losartan.     The longitudinal plan of care for the diagnosis(es)/condition(s) as documented were addressed during this visit. Due to the added complexity in care, I will continue to support Viviane in the subsequent management and with ongoing continuity of care.          See Patient Instructions    Subjective   Viviane is a 59 year old, presenting for the following health issues:  Medication Update (Recheck thyroid- has noticed feeling more jittery, more hair loss and not able to sleep at night is concerned that thyroid is off; wonders if she needs a updated ultrasound on her thyroid as well last scan was about 10 years ago, hx of nodules on both sides of thyroid. The right side was removed then and c/o the nodules may have changed )        9/11/2024    11:10 AM   Additional Questions   Roomed by Gen PAULA CMA     History of Present Illness       Reason for visit:   "Hyperthiroidism    She eats 4 or more servings of fruits and vegetables daily.She consumes 0 sweetened beverage(s) daily.She exercises with enough effort to increase her heart rate 30 to 60 minutes per day.  She exercises with enough effort to increase her heart rate 6 days per week.   She is taking medications regularly.   Hypothyroidism Follow-up    Since last visit, patient describes the following symptoms: anxiety, fatigue, and hair loss        Review of Systems  Constitutional, HEENT, cardiovascular, pulmonary, gi and gu systems are negative, except as otherwise noted.      Objective    /84 (BP Location: Right arm, Patient Position: Sitting, Cuff Size: Adult Regular)   Pulse 79   Temp 97.8  F (36.6  C) (Oral)   Resp 20   Ht 1.651 m (5' 5\")   Wt 112.5 kg (248 lb)   LMP  (LMP Unknown)   SpO2 98%   BMI 41.27 kg/m    Body mass index is 41.27 kg/m .  Physical Exam   GENERAL: alert and no distress  NECK: no adenopathy, no asymmetry, masses, or scars  RESP: lungs clear to auscultation - no rales, rhonchi or wheezes  CV: regular rate and rhythm, normal S1 S2, no S3 or S4, no murmur, click or rub, no peripheral edema  ABDOMEN: soft, nontender, no hepatosplenomegaly, no masses and bowel sounds normal  MS: no gross musculoskeletal defects noted, no edema            Signed Electronically by: Roberta Washington MD    "

## 2024-09-12 ENCOUNTER — DOCUMENTATION ONLY (OUTPATIENT)
Dept: OTHER | Facility: CLINIC | Age: 59
End: 2024-09-12
Payer: COMMERCIAL

## 2024-09-12 LAB — TSH SERPL DL<=0.005 MIU/L-ACNC: 0.48 UIU/ML (ref 0.3–4.2)

## 2024-09-26 DIAGNOSIS — E03.9 HYPOTHYROIDISM, UNSPECIFIED TYPE: ICD-10-CM

## 2024-09-26 RX ORDER — LEVOTHYROXINE SODIUM 137 UG/1
TABLET ORAL
Qty: 90 TABLET | Refills: 3 | OUTPATIENT
Start: 2024-09-26

## 2024-09-26 RX ORDER — LEVOTHYROXINE SODIUM 137 UG/1
TABLET ORAL
Qty: 90 TABLET | Refills: 2 | Status: SHIPPED | OUTPATIENT
Start: 2024-09-26

## 2024-10-08 ENCOUNTER — MYC MEDICAL ADVICE (OUTPATIENT)
Dept: FAMILY MEDICINE | Facility: CLINIC | Age: 59
End: 2024-10-08
Payer: COMMERCIAL

## 2024-10-08 DIAGNOSIS — E03.9 HYPOTHYROIDISM, UNSPECIFIED TYPE: ICD-10-CM

## 2024-10-09 RX ORDER — LEVOTHYROXINE SODIUM 137 UG/1
TABLET ORAL
Qty: 90 TABLET | Refills: 3 | Status: SHIPPED | OUTPATIENT
Start: 2024-10-09

## 2024-10-23 ENCOUNTER — IMMUNIZATION (OUTPATIENT)
Dept: FAMILY MEDICINE | Facility: CLINIC | Age: 59
End: 2024-10-23
Payer: COMMERCIAL

## 2024-10-23 DIAGNOSIS — Z23 ENCOUNTER FOR IMMUNIZATION: Primary | ICD-10-CM

## 2024-10-23 PROCEDURE — 99207 PR NO CHARGE NURSE ONLY: CPT

## 2024-10-23 PROCEDURE — 91320 SARSCV2 VAC 30MCG TRS-SUC IM: CPT

## 2024-10-23 PROCEDURE — 90480 ADMN SARSCOV2 VAC 1/ONLY CMP: CPT

## 2024-10-23 NOTE — PROGRESS NOTES
Prior to immunization administration, verified patients identity using patient s name and date of birth. Please see Immunization Activity for additional information.     Screening Questionnaire for Adult Immunization    Are you sick today?   No   Do you have allergies to medications, food, a vaccine component or latex?   No   Have you ever had a serious reaction after receiving a vaccination?   No   Do you have a long-term health problem with heart, lung, kidney, or metabolic disease (e.g., diabetes), asthma, a blood disorder, no spleen, complement component deficiency, a cochlear implant, or a spinal fluid leak?  Are you on long-term aspirin therapy?   No   Do you have cancer, leukemia, HIV/AIDS, or any other immune system problem?   No   Do you have a parent, brother, or sister with an immune system problem?   No   In the past 3 months, have you taken medications that affect  your immune system, such as prednisone, other steroids, or anticancer drugs; drugs for the treatment of rheumatoid arthritis, Crohn s disease, or psoriasis; or have you had radiation treatments?   No   Have you had a seizure, or a brain or other nervous system problem?   No   During the past year, have you received a transfusion of blood or blood    products, or been given immune (gamma) globulin or antiviral drug?   No   For women: Are you pregnant or is there a chance you could become       pregnant during the next month?   No   Have you received any vaccinations in the past 4 weeks?   No     Immunization questionnaire answers were all negative.    I have reviewed the following standing orders:   This patient is due and qualifies for the Covid-19 vaccine.     Click here for COVID-19 Standing Order    I have reviewed the vaccines inclusion and exclusion criteria; No concerns regarding eligibility.     Patient instructed to remain in clinic for 15 minutes afterwards, and to report any adverse reactions.     Screening performed by Haleigh DAVENPORT  SCOTT Almanza on 10/23/2024 at 1:40 PM.

## 2024-10-24 ENCOUNTER — HOSPITAL ENCOUNTER (OUTPATIENT)
Dept: ULTRASOUND IMAGING | Facility: CLINIC | Age: 59
Discharge: HOME | End: 2024-10-24
Attending: FAMILY MEDICINE | Admitting: FAMILY MEDICINE
Payer: COMMERCIAL

## 2024-10-24 DIAGNOSIS — E04.1 THYROID NODULE: ICD-10-CM

## 2024-10-24 PROCEDURE — 76536 US EXAM OF HEAD AND NECK: CPT

## 2024-11-27 ENCOUNTER — OFFICE VISIT (OUTPATIENT)
Dept: URGENT CARE | Facility: URGENT CARE | Age: 59
End: 2024-11-27
Payer: COMMERCIAL

## 2024-11-27 VITALS
SYSTOLIC BLOOD PRESSURE: 172 MMHG | DIASTOLIC BLOOD PRESSURE: 108 MMHG | OXYGEN SATURATION: 97 % | TEMPERATURE: 97 F | HEART RATE: 91 BPM | WEIGHT: 245 LBS | BODY MASS INDEX: 40.77 KG/M2 | RESPIRATION RATE: 16 BRPM

## 2024-11-27 DIAGNOSIS — S30.851A FOREIGN BODY IN UMBILICUS: Primary | ICD-10-CM

## 2024-11-27 ASSESSMENT — ENCOUNTER SYMPTOMS
ARTHRALGIAS: 0
JOINT SWELLING: 0
CARDIOVASCULAR NEGATIVE: 1
NECK STIFFNESS: 0
WEAKNESS: 0
FEVER: 0
ALLERGIC/IMMUNOLOGIC NEGATIVE: 1
ENDOCRINE NEGATIVE: 1
MUSCULOSKELETAL NEGATIVE: 1
WOUND: 0
EYES NEGATIVE: 1
PALPITATIONS: 0
NAUSEA: 0
SHORTNESS OF BREATH: 0
COUGH: 0
BACK PAIN: 0
NECK PAIN: 0
VOMITING: 0
RESPIRATORY NEGATIVE: 1
MYALGIAS: 0
HEADACHES: 0
LIGHT-HEADEDNESS: 0
DIARRHEA: 0
CHILLS: 0
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
## 2024-12-04 ENCOUNTER — TELEPHONE (OUTPATIENT)
Dept: FAMILY MEDICINE | Facility: CLINIC | Age: 59
End: 2024-12-04
Payer: COMMERCIAL

## 2024-12-04 NOTE — TELEPHONE ENCOUNTER
Patient Quality Outreach    Patient is due for the following:   Cervical Cancer Screening - PAP Needed    Action(s) Taken:   None; pt had hysterectomy. Surgical history updated to reflect pap no longer indication     Type of outreach:    Chart review performed, no outreach needed.    Questions for provider review:    None           Jennifer Welch, CMA

## 2024-12-11 ENCOUNTER — TELEPHONE (OUTPATIENT)
Dept: SLEEP MEDICINE | Facility: CLINIC | Age: 59
End: 2024-12-11
Payer: COMMERCIAL

## 2024-12-11 NOTE — TELEPHONE ENCOUNTER
Pt was called to schedule appt with Judy Castillo. RACHNA with phone number to call asking them to call back.    Camila Marc    [facility]

## 2024-12-20 ENCOUNTER — TRANSFERRED RECORDS (OUTPATIENT)
Dept: HEALTH INFORMATION MANAGEMENT | Facility: CLINIC | Age: 59
End: 2024-12-20

## 2024-12-20 ENCOUNTER — HOSPITAL ENCOUNTER (OUTPATIENT)
Dept: MAMMOGRAPHY | Facility: CLINIC | Age: 59
Discharge: HOME | End: 2024-12-20
Attending: FAMILY MEDICINE | Admitting: FAMILY MEDICINE
Payer: COMMERCIAL

## 2024-12-20 DIAGNOSIS — Z12.31 VISIT FOR SCREENING MAMMOGRAM: ICD-10-CM

## 2024-12-20 PROCEDURE — 77063 BREAST TOMOSYNTHESIS BI: CPT

## 2025-01-23 ENCOUNTER — E-VISIT (OUTPATIENT)
Dept: FAMILY MEDICINE | Facility: CLINIC | Age: 60
End: 2025-01-23
Payer: COMMERCIAL

## 2025-01-23 DIAGNOSIS — N39.0 ACUTE UTI (URINARY TRACT INFECTION): Primary | ICD-10-CM

## 2025-01-23 RX ORDER — NITROFURANTOIN 25; 75 MG/1; MG/1
100 CAPSULE ORAL 2 TIMES DAILY
Qty: 10 CAPSULE | Refills: 0 | Status: SHIPPED | OUTPATIENT
Start: 2025-01-23 | End: 2025-01-28

## 2025-01-23 NOTE — PATIENT INSTRUCTIONS
Dear Charo Leung    After reviewing your responses, I've been able to diagnose you with a urinary tract infection, which is a common infection of the bladder with bacteria.  This is not a sexually transmitted infection, though urinating immediately after intercourse can help prevent infections.  Drinking lots of fluids is also helpful to clear your current infection and prevent the next one.      I have sent a prescription for antibiotics to your pharmacy to treat this infection.    It is important that you take all of your prescribed medication even if your symptoms are improving after a few doses.  Taking all of your medicine helps prevent the symptoms from returning.     If your symptoms worsen, you develop pain in your back or stomach, develop fevers, or are not improving in 5 days, please contact your primary care provider for an appointment or visit any of our convenient Walk-in or Urgent Care Centers to be seen, which can be found on our website here.    Thanks again for choosing us as your health care partner,    Roberta Washington MD  Dear Charo Leung,     After reviewing your responses, I would like you to come in for a urine test to make sure we treat you correctly. This urine test is to evaluate you for a possible urinary tract infection, and should be scheduled for today or tomorrow. Schedule a Lab Only appointment here.     Lab appointments are not available at most locations on the weekends. If no Lab Only appointment is available, you should be seen in any of our convenient Walk-in or Urgent Care Centers, which can be found on our website here.     You will receive instructions with your results in [employer] once they are available.     If your symptoms worsen, you develop pain in your back or stomach, develop fevers, or are not improving in 5 days, please contact your primary care provider for an appointment or visit a Walk-in or Urgent Care Center to be seen.     Thanks again for choosing us as  your health care partner,     Roberta Washington MD

## 2025-05-06 ENCOUNTER — HOSPITAL ENCOUNTER (EMERGENCY)
Facility: CLINIC | Age: 60
Discharge: HOME | End: 2025-05-07
Attending: EMERGENCY MEDICINE | Admitting: EMERGENCY MEDICINE
Payer: COMMERCIAL

## 2025-05-06 ENCOUNTER — APPOINTMENT (OUTPATIENT)
Dept: MRI IMAGING | Facility: CLINIC | Age: 60
End: 2025-05-06
Attending: EMERGENCY MEDICINE
Payer: COMMERCIAL

## 2025-05-06 DIAGNOSIS — H43.392 VITREOUS FLOATERS OF LEFT EYE: ICD-10-CM

## 2025-05-06 DIAGNOSIS — R93.89 ABNORMAL MRI: ICD-10-CM

## 2025-05-06 DIAGNOSIS — H53.40 VISUAL FIELD DEFECT OF LEFT EYE: ICD-10-CM

## 2025-05-06 LAB
ALBUMIN SERPL BCG-MCNC: 4.7 G/DL (ref 3.5–5.2)
ALP SERPL-CCNC: 74 U/L (ref 40–150)
ALT SERPL W P-5'-P-CCNC: 21 U/L (ref 0–50)
ANION GAP SERPL CALCULATED.3IONS-SCNC: 14 MMOL/L (ref 7–15)
AST SERPL W P-5'-P-CCNC: 24 U/L (ref 0–45)
BASOPHILS # BLD AUTO: 0.1 10E3/UL (ref 0–0.2)
BASOPHILS NFR BLD AUTO: 1 %
BILIRUB SERPL-MCNC: 0.4 MG/DL
BUN SERPL-MCNC: 18.1 MG/DL (ref 8–23)
CALCIUM SERPL-MCNC: 10 MG/DL (ref 8.8–10.4)
CHLORIDE SERPL-SCNC: 102 MMOL/L (ref 98–107)
CREAT SERPL-MCNC: 0.72 MG/DL (ref 0.51–0.95)
EGFRCR SERPLBLD CKD-EPI 2021: >90 ML/MIN/1.73M2
EOSINOPHIL # BLD AUTO: 0.3 10E3/UL (ref 0–0.7)
EOSINOPHIL NFR BLD AUTO: 3 %
ERYTHROCYTE [DISTWIDTH] IN BLOOD BY AUTOMATED COUNT: 13.2 % (ref 10–15)
GLUCOSE SERPL-MCNC: 99 MG/DL (ref 70–99)
HCO3 SERPL-SCNC: 22 MMOL/L (ref 22–29)
HCT VFR BLD AUTO: 39.6 % (ref 35–47)
HGB BLD-MCNC: 13.7 G/DL (ref 11.7–15.7)
IMM GRANULOCYTES # BLD: 0 10E3/UL
IMM GRANULOCYTES NFR BLD: 0 %
INR PPP: 0.93 (ref 0.85–1.15)
LYMPHOCYTES # BLD AUTO: 3.3 10E3/UL (ref 0.8–5.3)
LYMPHOCYTES NFR BLD AUTO: 32 %
MAGNESIUM SERPL-MCNC: 2.3 MG/DL (ref 1.7–2.3)
MCH RBC QN AUTO: 30.5 PG (ref 26.5–33)
MCHC RBC AUTO-ENTMCNC: 34.6 G/DL (ref 31.5–36.5)
MCV RBC AUTO: 88 FL (ref 78–100)
MONOCYTES # BLD AUTO: 1 10E3/UL (ref 0–1.3)
MONOCYTES NFR BLD AUTO: 10 %
NEUTROPHILS # BLD AUTO: 5.6 10E3/UL (ref 1.6–8.3)
NEUTROPHILS NFR BLD AUTO: 55 %
NRBC # BLD AUTO: 0 10E3/UL
NRBC BLD AUTO-RTO: 0 /100
PLATELET # BLD AUTO: 274 10E3/UL (ref 150–450)
POTASSIUM SERPL-SCNC: 3.8 MMOL/L (ref 3.4–5.3)
PROT SERPL-MCNC: 7.6 G/DL (ref 6.4–8.3)
PROTHROMBIN TIME: 12.6 SECONDS (ref 11.8–14.8)
RBC # BLD AUTO: 4.49 10E6/UL (ref 3.8–5.2)
SODIUM SERPL-SCNC: 138 MMOL/L (ref 135–145)
TROPONIN T SERPL HS-MCNC: <6 NG/L
WBC # BLD AUTO: 10.2 10E3/UL (ref 4–11)

## 2025-05-06 PROCEDURE — 99285 EMERGENCY DEPT VISIT HI MDM: CPT | Mod: 25

## 2025-05-06 PROCEDURE — 93005 ELECTROCARDIOGRAM TRACING: CPT | Performed by: EMERGENCY MEDICINE

## 2025-05-06 PROCEDURE — 70553 MRI BRAIN STEM W/O & W/DYE: CPT

## 2025-05-06 PROCEDURE — 70549 MR ANGIOGRAPH NECK W/O&W/DYE: CPT

## 2025-05-06 PROCEDURE — 83735 ASSAY OF MAGNESIUM: CPT | Performed by: EMERGENCY MEDICINE

## 2025-05-06 PROCEDURE — 250N000013 HC RX MED GY IP 250 OP 250 PS 637: Performed by: EMERGENCY MEDICINE

## 2025-05-06 PROCEDURE — 36415 COLL VENOUS BLD VENIPUNCTURE: CPT | Performed by: EMERGENCY MEDICINE

## 2025-05-06 PROCEDURE — 70544 MR ANGIOGRAPHY HEAD W/O DYE: CPT | Mod: XU

## 2025-05-06 PROCEDURE — 255N000002 HC RX 255 OP 636: Performed by: EMERGENCY MEDICINE

## 2025-05-06 PROCEDURE — 84484 ASSAY OF TROPONIN QUANT: CPT | Performed by: EMERGENCY MEDICINE

## 2025-05-06 PROCEDURE — A9585 GADOBUTROL INJECTION: HCPCS | Performed by: EMERGENCY MEDICINE

## 2025-05-06 PROCEDURE — 85004 AUTOMATED DIFF WBC COUNT: CPT | Performed by: EMERGENCY MEDICINE

## 2025-05-06 PROCEDURE — 84155 ASSAY OF PROTEIN SERUM: CPT | Performed by: EMERGENCY MEDICINE

## 2025-05-06 PROCEDURE — 85610 PROTHROMBIN TIME: CPT | Performed by: EMERGENCY MEDICINE

## 2025-05-06 RX ORDER — LORAZEPAM 1 MG/1
1 TABLET ORAL ONCE
Status: COMPLETED | OUTPATIENT
Start: 2025-05-06 | End: 2025-05-06

## 2025-05-06 RX ORDER — GADOBUTROL 604.72 MG/ML
10 INJECTION INTRAVENOUS ONCE
Status: COMPLETED | OUTPATIENT
Start: 2025-05-06 | End: 2025-05-06

## 2025-05-06 RX ADMIN — LORAZEPAM 1 MG: 1 TABLET ORAL at 21:45

## 2025-05-06 RX ADMIN — GADOBUTROL 10 ML: 604.72 INJECTION INTRAVENOUS at 23:00

## 2025-05-06 ASSESSMENT — ACTIVITIES OF DAILY LIVING (ADL)
ADLS_ACUITY_SCORE: 41

## 2025-05-07 ENCOUNTER — PATIENT OUTREACH (OUTPATIENT)
Dept: CARE COORDINATION | Facility: CLINIC | Age: 60
End: 2025-05-07
Payer: COMMERCIAL

## 2025-05-07 VITALS
DIASTOLIC BLOOD PRESSURE: 77 MMHG | OXYGEN SATURATION: 98 % | HEART RATE: 74 BPM | SYSTOLIC BLOOD PRESSURE: 170 MMHG | TEMPERATURE: 97.2 F | RESPIRATION RATE: 16 BRPM

## 2025-05-07 LAB
ATRIAL RATE - MUSE: 88 BPM
DIASTOLIC BLOOD PRESSURE - MUSE: 91 MMHG
INTERPRETATION ECG - MUSE: NORMAL
P AXIS - MUSE: 48 DEGREES
PR INTERVAL - MUSE: 214 MS
QRS DURATION - MUSE: 96 MS
QT - MUSE: 364 MS
QTC - MUSE: 440 MS
R AXIS - MUSE: 74 DEGREES
SYSTOLIC BLOOD PRESSURE - MUSE: 194 MMHG
T AXIS - MUSE: 3 DEGREES
VENTRICULAR RATE- MUSE: 88 BPM
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
## 2025-05-07 NOTE — ED TRIAGE NOTES
Pt reporting starting at 1600 her left eye began having light flashing and specks of black. Reporting pressure in the left eye.      Triage Assessment (Adult)       Row Name 05/06/25 2030          Triage Assessment    Airway WDL WDL        Respiratory WDL    Respiratory WDL WDL        Skin Circulation/Temperature WDL    Skin Circulation/Temperature WDL WDL        Cardiac WDL    Cardiac WDL WDL        Peripheral/Neurovascular WDL    Peripheral Neurovascular WDL WDL        Cognitive/Neuro/Behavioral WDL    Cognitive/Neuro/Behavioral WDL WDL

## 2025-05-08 ENCOUNTER — TELEPHONE (OUTPATIENT)
Dept: FAMILY MEDICINE | Facility: CLINIC | Age: 60
End: 2025-05-08
Payer: COMMERCIAL

## 2025-05-08 NOTE — TELEPHONE ENCOUNTER
Left message for patient to return call. If patient calls back, please route to [facility].     TCs, please send to RNs.    Chelsea Adams RN  [facility]    Notes for RN in case of return call from patient or second attempt call:  Discharged on 5/7/25 from [facility].  ER or Hospital? ED  Diagnosis for visit: Eye Problem  Other information if necessary: Should be following up with ophthalmology    Delete this note after TCM call documentation is completed.

## 2025-05-08 NOTE — TELEPHONE ENCOUNTER
Patient returning call; seen by eye specialist 5/7 and seeing neurology 5/12. Patient requested appt with PCP 5/15 to discuss BP.      Transitions of Care Outreach  Chief Complaint   Patient presents with    Hospital F/U       Most Recent Admission Date: 5/6/2025   Most Recent Admission Diagnosis:      Most Recent Discharge Date: 5/7/2025   Most Recent Discharge Diagnosis: Visual field defect of left eye - H53.40  Vitreous floaters of left eye - H43.392  Abnormal MRI - R93.89     Transitions of Care Assessment    Discharge Assessment  How are you doing now that you are home?: Doing well  How are your symptoms? (Red Flag symptoms escalate to triage hotline per guidelines): Improved  Do you know how to contact your clinic care team if you have future questions or changes to your health status? : Yes  Does the patient have their discharge instructions? : Yes  Does the patient have questions regarding their discharge instructions? : No  Were you started on any new medications or were there changes to any of your previous medications? : No  Does the patient have all of their medications?: Yes  Do you have questions regarding any of your medications? : No  Do you have all of your needed medical supplies or equipment (DME)?  (i.e. oxygen tank, CPAP, cane, etc.): No - What equipment or supplies are needed? (NA)    Follow up Plan     Discharge Follow-Up  Discharge follow up appointment scheduled in alignment with recommended follow up timeframe or Transitions of Risk Category? (Low = within 30 days; Moderate= within 14 days; High= within 7 days): Yes  Discharge Follow Up Appointment Date: 05/15/25  Discharge Follow Up Appointment Scheduled with?: Primary Care Provider (Seen by specialty 5/7)    Future Appointments   Date Time Provider Department Center   5/12/2025 11:00 AM Mary Perry MD [facility]   5/15/2025  1:00 PM Roberta Washington MD [facility]   7/17/2025  9:00 AM Roberta Washington MD [facility]  [facility]       Outpatient Plan as outlined on AVS reviewed with patient.    For any urgent concerns, please contact our 24 hour nurse triage line: [phone] ([phone])       Boris Barba RN

## 2025-05-11 ASSESSMENT — ANXIETY QUESTIONNAIRES
5. BEING SO RESTLESS THAT IT IS HARD TO SIT STILL: NOT AT ALL
3. WORRYING TOO MUCH ABOUT DIFFERENT THINGS: MORE THAN HALF THE DAYS
IF YOU CHECKED OFF ANY PROBLEMS ON THIS QUESTIONNAIRE, HOW DIFFICULT HAVE THESE PROBLEMS MADE IT FOR YOU TO DO YOUR WORK, TAKE CARE OF THINGS AT HOME, OR GET ALONG WITH OTHER PEOPLE: SOMEWHAT DIFFICULT
7. FEELING AFRAID AS IF SOMETHING AWFUL MIGHT HAPPEN: NEARLY EVERY DAY
GAD7 TOTAL SCORE: 13
2. NOT BEING ABLE TO STOP OR CONTROL WORRYING: NEARLY EVERY DAY
7. FEELING AFRAID AS IF SOMETHING AWFUL MIGHT HAPPEN: NEARLY EVERY DAY
8. IF YOU CHECKED OFF ANY PROBLEMS, HOW DIFFICULT HAVE THESE MADE IT FOR YOU TO DO YOUR WORK, TAKE CARE OF THINGS AT HOME, OR GET ALONG WITH OTHER PEOPLE?: SOMEWHAT DIFFICULT
6. BECOMING EASILY ANNOYED OR IRRITABLE: SEVERAL DAYS
4. TROUBLE RELAXING: SEVERAL DAYS
1. FEELING NERVOUS, ANXIOUS, OR ON EDGE: NEARLY EVERY DAY
GAD7 TOTAL SCORE: 13
GAD7 TOTAL SCORE: 13

## 2025-05-15 ENCOUNTER — OFFICE VISIT (OUTPATIENT)
Dept: FAMILY MEDICINE | Facility: CLINIC | Age: 60
End: 2025-05-15
Payer: COMMERCIAL

## 2025-05-15 VITALS
HEIGHT: 65 IN | OXYGEN SATURATION: 95 % | DIASTOLIC BLOOD PRESSURE: 82 MMHG | HEART RATE: 68 BPM | SYSTOLIC BLOOD PRESSURE: 144 MMHG | TEMPERATURE: 98.9 F | WEIGHT: 244 LBS | RESPIRATION RATE: 16 BRPM | BODY MASS INDEX: 40.65 KG/M2

## 2025-05-15 DIAGNOSIS — F41.1 GENERALIZED ANXIETY DISORDER: ICD-10-CM

## 2025-05-15 DIAGNOSIS — I10 PRIMARY HYPERTENSION: Chronic | ICD-10-CM

## 2025-05-15 DIAGNOSIS — Z09 FOLLOW-UP EXAM: Primary | ICD-10-CM

## 2025-05-15 RX ORDER — LOSARTAN POTASSIUM 50 MG/1
50 TABLET ORAL DAILY
Qty: 90 TABLET | Refills: 3 | Status: SHIPPED | OUTPATIENT
Start: 2025-05-15

## 2025-05-15 NOTE — PROGRESS NOTES
"  Assessment & Plan     (Z09) Follow-up exam  (primary encounter diagnosis)  Comment: reviewed the chart pt went to er at 5/6 when she had abnormal vision of left eye. In the er her bp was very high. Lab, EKG not remarkable, head mri/mra some abnormalities. Vision improved. Pt state that day was very hot and she worked outside and did not push water. She might have heat exhaustion.   Plan: pt already saw ophthalmologist, her vision improved and no more black floaters. She has appointment with neurologist for the follow-up. Advise to push water at hot day and avoid prolonged long exposure in the hot environment.     (I10) Primary hypertension  Comment: bp not good  controlled at home. Home bp around 110-130/70-80. She takes losartan 50 mg daily and no side effect. Denies cp, sob, palpitation  Plan: losartan (COZAAR) 50 MG tablet         Continue current medication and home bp monitor. If bp is < 100/60 she will hold it.     (F41.1) Generalized anxiety disorder  Comment: pt state sometimes she is anxious and have panic attack.   Plan: will continue observe, consider medication or therapy if symptoms get worse,     The longitudinal plan of care for the diagnosis(es)/condition(s) as documented were addressed during this visit. Due to the added complexity in care, I will continue to support Viviane in the subsequent management and with ongoing continuity of care.      MED REC REQUIRED  Post Medication Reconciliation Status: discharge medications reconciled and changed, per note/orders  BMI  Estimated body mass index is 40.6 kg/m  as calculated from the following:    Height as of this encounter: 1.651 m (5' 5\").    Weight as of this encounter: 110.7 kg (244 lb).   Weight management plan: Discussed healthy diet and exercise guidelines      Follow-up   No follow-ups on file.        Subjective   Viviane is a 60 year old, presenting for the following health issues:  Hospital F/U        5/15/2025    12:41 PM   Additional Questions " "  Roomed by Gen PAULA CMA     History of Present Illness       Mental Health Follow-up:  Patient presents to follow-up on Anxiety.    Patient's anxiety since last visit has been:  Worse  The patient is having other symptoms associated with anxiety.  Any significant life events: health concerns  Patient is feeling anxious or having panic attacks.  Patient has no concerns about alcohol or drug use.    Hypertension: She presents for follow up of hypertension.  She does check blood pressure  regularly outside of the clinic. Outside blood pressures have been over 140/90. She follows a low salt diet.     She eats 4 or more servings of fruits and vegetables daily.She consumes 0 sweetened beverage(s) daily.She exercises with enough effort to increase her heart rate 30 to 60 minutes per day.  She exercises with enough effort to increase her heart rate 6 days per week.   She is taking medications regularly.         Hypertension Follow-up    Do you check your blood pressure regularly outside of the clinic? Yes   Are you following a low salt diet? Yes  Are your blood pressures ever more than 140 on the top number (systolic) OR more   than 90 on the bottom number (diastolic), for example 140/90? No    BP Readings from Last 2 Encounters:   05/15/25 (!) 144/82   05/07/25 (!) 170/77          Review of Systems  Constitutional, HEENT, cardiovascular, pulmonary, gi and gu systems are negative, except as otherwise noted.      Objective    BP (!) 144/82 (BP Location: Right arm, Patient Position: Sitting, Cuff Size: Adult Regular)   Pulse 68   Temp 98.9  F (37.2  C) (Oral)   Resp 16   Ht 1.651 m (5' 5\")   Wt 110.7 kg (244 lb)   LMP  (LMP Unknown)   SpO2 95%   BMI 40.60 kg/m    Body mass index is 40.6 kg/m .  Physical Exam   GENERAL: alert and no distress  NECK: no adenopathy, no asymmetry, masses, or scars  RESP: lungs clear to auscultation - no rales, rhonchi or wheezes  CV: regular rate and rhythm, normal S1 S2, no S3 or S4, no " murmur, click or rub, no peripheral edema  ABDOMEN: soft, nontender, no hepatosplenomegaly, no masses and bowel sounds normal  MS: no gross musculoskeletal defects noted, no edema            Signed Electronically by: Roberta Washington MD

## 2025-06-09 ENCOUNTER — MYC MEDICAL ADVICE (OUTPATIENT)
Dept: FAMILY MEDICINE | Facility: CLINIC | Age: 60
End: 2025-06-09
Payer: COMMERCIAL

## 2025-06-23 DIAGNOSIS — E03.9 HYPOTHYROIDISM, UNSPECIFIED TYPE: ICD-10-CM

## 2025-06-23 RX ORDER — LEVOTHYROXINE SODIUM 137 UG/1
TABLET ORAL
Qty: 90 TABLET | Refills: 0 | Status: SHIPPED | OUTPATIENT
Start: 2025-06-23

## 2025-06-23 NOTE — TELEPHONE ENCOUNTER
levothyroxine (SYNTHROID/LEVOTHROID) 137 MCG tablet   [facility]

## 2025-07-12 SDOH — HEALTH STABILITY: PHYSICAL HEALTH: ON AVERAGE, HOW MANY MINUTES DO YOU ENGAGE IN EXERCISE AT THIS LEVEL?: 40 MIN

## 2025-07-12 SDOH — HEALTH STABILITY: PHYSICAL HEALTH: ON AVERAGE, HOW MANY DAYS PER WEEK DO YOU ENGAGE IN MODERATE TO STRENUOUS EXERCISE (LIKE A BRISK WALK)?: 6 DAYS

## 2025-07-12 ASSESSMENT — SOCIAL DETERMINANTS OF HEALTH (SDOH): HOW OFTEN DO YOU GET TOGETHER WITH FRIENDS OR RELATIVES?: MORE THAN THREE TIMES A WEEK

## 2025-07-17 ENCOUNTER — OFFICE VISIT (OUTPATIENT)
Dept: FAMILY MEDICINE | Facility: CLINIC | Age: 60
End: 2025-07-17
Attending: FAMILY MEDICINE
Payer: COMMERCIAL

## 2025-07-17 VITALS
RESPIRATION RATE: 18 BRPM | SYSTOLIC BLOOD PRESSURE: 122 MMHG | OXYGEN SATURATION: 99 % | DIASTOLIC BLOOD PRESSURE: 70 MMHG | BODY MASS INDEX: 40.12 KG/M2 | TEMPERATURE: 98.1 F | HEIGHT: 65 IN | WEIGHT: 240.8 LBS | HEART RATE: 59 BPM

## 2025-07-17 DIAGNOSIS — I10 PRIMARY HYPERTENSION: Chronic | ICD-10-CM

## 2025-07-17 DIAGNOSIS — E78.2 MIXED HYPERLIPIDEMIA: ICD-10-CM

## 2025-07-17 DIAGNOSIS — E03.9 HYPOTHYROIDISM, UNSPECIFIED TYPE: Chronic | ICD-10-CM

## 2025-07-17 DIAGNOSIS — Z00.00 ROUTINE GENERAL MEDICAL EXAMINATION AT A HEALTH CARE FACILITY: Primary | ICD-10-CM

## 2025-07-17 DIAGNOSIS — E66.01 MORBID OBESITY (H): Chronic | ICD-10-CM

## 2025-07-17 LAB
CHOLEST SERPL-MCNC: 254 MG/DL
FASTING STATUS PATIENT QL REPORTED: YES
HDLC SERPL-MCNC: 69 MG/DL
LDLC SERPL CALC-MCNC: 161 MG/DL
NONHDLC SERPL-MCNC: 185 MG/DL
TRIGL SERPL-MCNC: 118 MG/DL
TSH SERPL DL<=0.005 MIU/L-ACNC: 0.35 UIU/ML (ref 0.3–4.2)

## 2025-07-17 RX ORDER — LOSARTAN POTASSIUM 50 MG/1
50 TABLET ORAL DAILY
Qty: 90 TABLET | Refills: 3 | Status: SHIPPED | OUTPATIENT
Start: 2025-07-17

## 2025-07-17 RX ORDER — LEVOTHYROXINE SODIUM 137 UG/1
TABLET ORAL
Qty: 90 TABLET | Refills: 3 | Status: SHIPPED | OUTPATIENT
Start: 2025-07-17

## 2025-07-17 ASSESSMENT — PAIN SCALES - GENERAL: PAINLEVEL_OUTOF10: NO PAIN (0)

## 2025-07-17 NOTE — PATIENT INSTRUCTIONS
Patient Education   Preventive Care Advice   This is general advice given by our system to help you stay healthy. However, your care team may have specific advice just for you. Please talk to your care team about your preventive care needs.  Nutrition  Eat 5 or more servings of fruits and vegetables each day.  Try wheat bread, brown rice and whole grain pasta (instead of white bread, rice, and pasta).  Get enough calcium and vitamin D. Check the label on foods and aim for 100% of the RDA (recommended daily allowance).  Lifestyle  Exercise at least 150 minutes each week  (30 minutes a day, 5 days a week).  Do muscle strengthening activities 2 days a week. These help control your weight and prevent disease.  No smoking.  Wear sunscreen to prevent skin cancer.  Have a dental exam and cleaning every 6 months.  Yearly exams  See your health care team every year to talk about:  Any changes in your health.  Any medicines your care team has prescribed.  Preventive care, family planning, and ways to prevent chronic diseases.  Shots (vaccines)   HPV shots (up to age 26), if you've never had them before.  Hepatitis B shots (up to age 59), if you've never had them before.  COVID-19 shot: Get this shot when it's due.  Flu shot: Get a flu shot every year.  Tetanus shot: Get a tetanus shot every 10 years.  Pneumococcal, hepatitis A, and RSV shots: Ask your care team if you need these based on your risk.  Shingles shot (for age 50 and up)  General health tests  Diabetes screening:  Starting at age 35, Get screened for diabetes at least every 3 years.  If you are younger than age 35, ask your care team if you should be screened for diabetes.  Cholesterol test: At age 39, start having a cholesterol test every 5 years, or more often if advised.  Bone density scan (DEXA): At age 50, ask your care team if you should have this scan for osteoporosis (brittle bones).  Hepatitis C: Get tested at least once in your life.  STIs (sexually  transmitted infections)  Before age 24: Ask your care team if you should be screened for STIs.  After age 24: Get screened for STIs if you're at risk. You are at risk for STIs (including HIV) if:  You are sexually active with more than one person.  You don't use condoms every time.  You or a partner was diagnosed with a sexually transmitted infection.  If you are at risk for HIV, ask about PrEP medicine to prevent HIV.  Get tested for HIV at least once in your life, whether you are at risk for HIV or not.  Cancer screening tests  Cervical cancer screening: If you have a cervix, begin getting regular cervical cancer screening tests starting at age 21.  Breast cancer scan (mammogram): If you've ever had breasts, begin having regular mammograms starting at age 40. This is a scan to check for breast cancer.  Colon cancer screening: It is important to start screening for colon cancer at age 45.  Have a colonoscopy test every 10 years (or more often if you're at risk) Or, ask your provider about stool tests like a FIT test every year or Cologuard test every 3 years.  To learn more about your testing options, visit:   .  For help making a decision, visit:   https://bit.ly/du94631.  Prostate cancer screening test: If you have a prostate, ask your care team if a prostate cancer screening test (PSA) at age 55 is right for you.  Lung cancer screening: If you are a current or former smoker ages 50 to 80, ask your care team if ongoing lung cancer screenings are right for you.  For informational purposes only. Not to replace the advice of your health care provider. Copyright   2023 [facility]. All rights reserved. Clinically reviewed by the [facility]. [employer] 117952 - REV 01/24.  Learning About Stress  What is stress?     Stress is your body's response to a hard situation. Your body can have a physical, emotional, or mental response. Stress is a fact of life for most people, and it  affects everyone differently. What causes stress for you may not be stressful for someone else.  A lot of things can cause stress. You may feel stress when you go on a job interview, take a test, or run a race. This kind of short-term stress is normal and even useful. It can help you if you need to work hard or react quickly. For example, stress can help you finish an important job on time.  Long-term stress is caused by ongoing stressful situations or events. Examples of long-term stress include long-term health problems, ongoing problems at work, or conflicts in your family. Long-term stress can harm your health.  How does stress affect your health?  When you are stressed, your body responds as though you are in danger. It makes hormones that speed up your heart, make you breathe faster, and give you a burst of energy. This is called the fight-or-flight stress response. If the stress is over quickly, your body goes back to normal and no harm is done.  But if stress happens too often or lasts too long, it can have bad effects. Long-term stress can make you more likely to get sick, and it can make symptoms of some diseases worse. If you tense up when you are stressed, you may develop neck, shoulder, or low back pain. Stress is linked to high blood pressure and heart disease.  Stress also harms your emotional health. It can make you mckeon, tense, or depressed. Your relationships may suffer, and you may not do well at work or school.  What can you do to manage stress?  You can try these things to help manage stress:   Do something active. Exercise or activity can help reduce stress. Walking is a great way to get started. Even everyday activities such as housecleaning or yard work can help.  Try yoga or nikolay chi. These techniques combine exercise and meditation. You may need some training at first to learn them.  Do something you enjoy. For example, listen to music or go to a movie. Practice your hobby or do volunteer  "work.  Meditate. This can help you relax, because you are not worrying about what happened before or what may happen in the future.  Do guided imagery. Imagine yourself in any setting that helps you feel calm. You can use online videos, books, or a teacher to guide you.  Do breathing exercises. For example:  From a standing position, bend forward from the waist with your knees slightly bent. Let your arms dangle close to the floor.  Breathe in slowly and deeply as you return to a standing position. Roll up slowly and lift your head last.  Hold your breath for just a few seconds in the standing position.  Breathe out slowly and bend forward from the waist.  Let your feelings out. Talk, laugh, cry, and express anger when you need to. Talking with supportive friends or family, a counselor, or a juliette leader about your feelings is a healthy way to relieve stress. Avoid discussing your feelings with people who make you feel worse.  Write. It may help to write about things that are bothering you. This helps you find out how much stress you feel and what is causing it. When you know this, you can find better ways to cope.  What can you do to prevent stress?  You might try some of these things to help prevent stress:  Manage your time. This helps you find time to do the things you want and need to do.  Get enough sleep. Your body recovers from the stresses of the day while you are sleeping.  Get support. Your family, friends, and community can make a difference in how you experience stress.  Limit your news feed. Avoid or limit time on social media or news that may make you feel stressed.  Do something active. Exercise or activity can help reduce stress. Walking is a great way to get started.  Where can you learn more?  Go to https://www.[facility].net/patiented  Enter N032 in the search box to learn more about \"Learning About Stress.\"  Current as of: October 24, 2024  Content Version: 14.5 2024-2025 [employer]   Care instructions adapted under license by your healthcare professional. If you have questions about a medical condition or this instruction, always ask your healthcare professional. [employer] disclaims any warranty or liability for your use of this information.

## 2025-07-17 NOTE — PROGRESS NOTES
Preventive Care Visit  [facility]  Roberta Washington MD, Family Medicine  Jul 17, 2025      Assessment & Plan     (Z00.00) Routine general medical examination at a health care facility  (primary encounter diagnosis)  Comment: encourage annual pe and vaccine up date  Plan:     (E78.2) Mixed hyperlipidemia  Comment: she is doing well with medication.   Plan: Lipid panel reflex to direct LDL Fasting        Continue current medication and low fat diet    (E03.9) Hypothyroidism, unspecified type  Comment: stable and she takes medication  Plan: levothyroxine (SYNTHROID/LEVOTHROID) 137 MCG         tablet, TSH with free T4 reflex        Will follow-up lab    (I10) Primary hypertension  Comment: bp good controlled. Home bp monitor around 110-130/70-80. She takes medication as instructed and no side effect. Denies cp, sob, palpitation, headache and blurry vision.   Plan: losartan (COZAAR) 50 MG tablet        Continue current medication and home bp monitor. If bp is < 100/60 she will hold it.     (E66.01) Morbid obesity (H)  Comment: bmi 40 and failed to improve.   Plan: advise regular exercise and health diet to loss weight.     The longitudinal plan of care for the diagnosis(es)/condition(s) as documented were addressed during this visit. Due to the added complexity in care, I will continue to support Viviane in the subsequent management and with ongoing continuity of care.    Patient has been advised of split billing requirements and indicates understanding: Yes    Counseling  Appropriate preventive services were addressed with this patient via screening, questionnaire, or discussion as appropriate for fall prevention, nutrition, physical activity, Tobacco-use cessation, social engagement, weight loss and cognition.  Checklist reviewing preventive services available has been given to the patient.  Reviewed patient's diet, addressing concerns and/or questions.   She is at risk for psychosocial distress and  has been provided with information to reduce risk.   Reviewed preventive health counseling, as reflected in patient instructions        Subjective   Viviane is a 60 year old, presenting for the following:  Physical (Patient is here today for an annual exam, fasting for lab work. )        7/17/2025     8:39 AM   Additional Questions   Roomed by Joanna DOWD CMA   Accompanied by N/A         7/17/2025     8:39 AM   Patient Reported Additional Medications   Patient reports taking the following new medications N/A          HPI       Hyperlipidemia Follow-Up    Are you regularly taking any medication or supplement to lower your cholesterol?   No  Are you having muscle aches or other side effects that you think could be caused by your cholesterol lowering medication?  No    Hypertension Follow-up    Do you check your blood pressure regularly outside of the clinic? yes  Are you following a low salt diet? Yes  Are your blood pressures ever more than 140 on the top number (systolic) OR more   than 90 on the bottom number (diastolic), for example 140/90? No  BP Readings from Last 2 Encounters:   07/17/25 122/70   05/15/25 (!) 144/82     Hypothyroidism Follow-up    Since last visit, patient describes the following symptoms: Weight stable, no hair loss, no skin changes, no constipation, no loose stools    Advance Care Planning    Discussed advance care planning with patient; however, patient declined at this time.        7/12/2025   General Health   How would you rate your overall physical health? (!) FAIR   Feel stress (tense, anxious, or unable to sleep) Rather much   (!) STRESS CONCERN      7/12/2025   Nutrition   Three or more servings of calcium each day? Yes   Diet: Regular (no restrictions)   How many servings of fruit and vegetables per day? 4 or more   How many sweetened beverages each day? 0-1         7/12/2025   Exercise   Days per week of moderate/strenous exercise 6 days   Average minutes spent exercising at this level 40  min         2025   Social Factors   Frequency of gathering with friends or relatives More than three times a week   Worry food won't last until get money to buy more No   Food not last or not have enough money for food? No   Do you have housing? (Housing is defined as stable permanent housing and does not include staying outside in a car, in a tent, in an abandoned building, in an overnight shelter, or couch-surfing.) Yes   Are you worried about losing your housing? No   Lack of transportation? No   Unable to get utilities (heat,electricity)? No         2025   Fall Risk   Fallen 2 or more times in the past year? No   Trouble with walking or balance? No          2025   Dental   Dentist two times every year? Yes         Today's PHQ-2 Score:       2025     4:41 PM   PHQ-2 (  [employer])   Q1: Little interest or pleasure in doing things 0   Q2: Feeling down, depressed or hopeless 0   PHQ-2 Score 0    Q1: Little interest or pleasure in doing things Not at all   Q2: Feeling down, depressed or hopeless Not at all   PHQ-2 Score 0       Patient-reported           2025   Substance Use   Alcohol more than 3/day or more than 7/wk No   Do you use any other substances recreationally? No     Social History     Tobacco Use    Smoking status: Former     Current packs/day: 0.00     Average packs/day: 0.5 packs/day for 24.2 years (12.1 ttl pk-yrs)     Types: Cigarettes     Start date: 1980     Quit date: 2004     Years since quittin.9     Passive exposure: Never    Smokeless tobacco: Never   Vaping Use    Vaping status: Never Used   Substance Use Topics    Alcohol use: Yes     Comment: Occasional    Drug use: No           2023   LAST FHS-7 RESULTS   1st degree relative breast or ovarian cancer No   Any relative bilateral breast cancer No   Any male have breast cancer No   Any ONE woman have BOTH breast AND ovarian cancer No   Any woman with breast cancer before 50yrs No   2 or more relatives  "with breast AND/OR ovarian cancer Yes   2 or more relatives with breast AND/OR bowel cancer Yes        Mammogram Screening - Mammogram every 1-2 years updated in Health Maintenance based on mutual decision making        7/12/2025   STI Screening   New sexual partner(s) since last STI/HIV test? No     History of abnormal Pap smear: Status post hysterectomy with removal of cervix and no history of CIN2 or greater or cervical cancer. Health Maintenance and Surgical History updated.       ASCVD Risk   The 10-year ASCVD risk score (Uday EDWARDS, et al., 2019) is: 5%    Values used to calculate the score:      Age: 60 years      Sex: Female      Is Non- : No      Diabetic: No      Tobacco smoker: No      Systolic Blood Pressure: 122 mmHg      Is BP treated: Yes      HDL Cholesterol: 61 mg/dL      Total Cholesterol: 255 mg/dL           Reviewed and updated as needed this visit by Provider   Tobacco  Allergies  Meds  Problems  Med Hx  Surg Hx  Fam Hx            Lab work is in process  Labs reviewed in EPIC      Review of Systems  Constitutional, HEENT, cardiovascular, pulmonary, GI, , musculoskeletal, neuro, skin, endocrine and psych systems are negative, except as otherwise noted.     Objective    Exam  /70 (BP Location: Left arm, Patient Position: Sitting, Cuff Size: Adult Large)   Pulse 59   Temp 98.1  F (36.7  C) (Oral)   Resp 18   Ht 1.651 m (5' 5\")   Wt 109.2 kg (240 lb 12.8 oz)   LMP  (LMP Unknown)   SpO2 99%   Breastfeeding No   BMI 40.07 kg/m     Estimated body mass index is 40.07 kg/m  as calculated from the following:    Height as of this encounter: 1.651 m (5' 5\").    Weight as of this encounter: 109.2 kg (240 lb 12.8 oz).    Physical Exam  GENERAL: alert and no distress  EYES: Eyes grossly normal to inspection, PERRL and conjunctivae and sclerae normal  HENT: ear canals and TM's normal, nose and mouth without ulcers or lesions  NECK: no adenopathy, no " asymmetry, masses, or scars  RESP: lungs clear to auscultation - no rales, rhonchi or wheezes  BREAST: normal without masses, tenderness or nipple discharge and no palpable axillary masses or adenopathy  CV: regular rate and rhythm, normal S1 S2, no S3 or S4, no murmur, click or rub, no peripheral edema  ABDOMEN: soft, nontender, no hepatosplenomegaly, no masses and bowel sounds normal  MS: no gross musculoskeletal defects noted, no edema  SKIN: no suspicious lesions or rashes  NEURO: Normal strength and tone, mentation intact and speech normal  PSYCH: mentation appears normal, affect normal/bright        Signed Electronically by: Roberta Washington MD

## (undated) DEVICE — NDL 25GA 2"  8881200441

## (undated) DEVICE — GLOVE BIOGEL PI MICRO INDICATOR UNDERGLOVE SZ 7.0 48970

## (undated) DEVICE — ESU NDL COLORADO MICRO E1651

## (undated) DEVICE — SUCTION MANIFOLD NEPTUNE 2 SYS 4 PORT 0702-020-000

## (undated) DEVICE — DRAPE U SPLIT 74X120" 29440

## (undated) DEVICE — GLOVE BIOGEL PI MICRO SZ 6.5 48565

## (undated) DEVICE — LINEN TOWEL PACK X5 5464

## (undated) DEVICE — PREP SKIN SCRUB TRAY 4461A

## (undated) DEVICE — ESU GROUND PAD ADULT W/CORD E7507

## (undated) DEVICE — GLOVE GAMMEX NEOPRENE ULTRA SZ 7.5 LF 8515

## (undated) DEVICE — SOL NACL 0.9% IRRIG 500ML BOTTLE 2F7123

## (undated) DEVICE — GLOVE BIOGEL PI MICRO INDICATOR UNDERGLOVE SZ 8.0 48980

## (undated) DEVICE — DRSG STERI STRIP 1/2X4" R1547

## (undated) DEVICE — SOL WATER IRRIG 500ML BOTTLE 2F7113

## (undated) DEVICE — EYE PREP BETADINE 5% SOLUTION 30ML 0065-0411-30

## (undated) DEVICE — SYR EAR BULB 3OZ 0035830

## (undated) DEVICE — PACK MINOR CUSTOM ASC

## (undated) RX ORDER — FENTANYL CITRATE-0.9 % NACL/PF 10 MCG/ML
PLASTIC BAG, INJECTION (ML) INTRAVENOUS
Status: DISPENSED
Start: 2023-05-24

## (undated) RX ORDER — CLINDAMYCIN PHOSPHATE 900 MG/50ML
INJECTION, SOLUTION INTRAVENOUS
Status: DISPENSED
Start: 2023-05-24

## (undated) RX ORDER — PROPOFOL 10 MG/ML
INJECTION, EMULSION INTRAVENOUS
Status: DISPENSED
Start: 2023-05-24

## (undated) RX ORDER — FENTANYL CITRATE 50 UG/ML
INJECTION, SOLUTION INTRAMUSCULAR; INTRAVENOUS
Status: DISPENSED
Start: 2023-05-24

## (undated) RX ORDER — ACETAMINOPHEN 325 MG/1
TABLET ORAL
Status: DISPENSED
Start: 2023-05-24

## (undated) RX ORDER — LIDOCAINE HYDROCHLORIDE AND EPINEPHRINE 10; 10 MG/ML; UG/ML
INJECTION, SOLUTION INFILTRATION; PERINEURAL
Status: DISPENSED
Start: 2023-05-24

## (undated) RX ORDER — DEXAMETHASONE SODIUM PHOSPHATE 4 MG/ML
INJECTION, SOLUTION INTRA-ARTICULAR; INTRALESIONAL; INTRAMUSCULAR; INTRAVENOUS; SOFT TISSUE
Status: DISPENSED
Start: 2023-05-24

## (undated) RX ORDER — ONDANSETRON 2 MG/ML
INJECTION INTRAMUSCULAR; INTRAVENOUS
Status: DISPENSED
Start: 2023-05-24

## (undated) RX ORDER — GLYCOPYRROLATE 0.2 MG/ML
INJECTION INTRAMUSCULAR; INTRAVENOUS
Status: DISPENSED
Start: 2023-05-24